# Patient Record
Sex: FEMALE | Race: BLACK OR AFRICAN AMERICAN | NOT HISPANIC OR LATINO | Employment: UNEMPLOYED | URBAN - METROPOLITAN AREA
[De-identification: names, ages, dates, MRNs, and addresses within clinical notes are randomized per-mention and may not be internally consistent; named-entity substitution may affect disease eponyms.]

---

## 2021-02-17 ENCOUNTER — PREP FOR SURGERY (OUTPATIENT)
Dept: OTHER | Facility: HOSPITAL | Age: 41
End: 2021-02-17

## 2021-02-17 DIAGNOSIS — D25.1 INTRAMURAL LEIOMYOMA OF UTERUS: Primary | ICD-10-CM

## 2021-02-17 PROBLEM — D21.9 FIBROID: Status: ACTIVE | Noted: 2021-02-17

## 2021-02-17 RX ORDER — SODIUM CHLORIDE 0.9 % (FLUSH) 0.9 %
3 SYRINGE (ML) INJECTION EVERY 12 HOURS SCHEDULED
Status: CANCELLED | OUTPATIENT
Start: 2021-02-17

## 2021-02-17 RX ORDER — SODIUM CHLORIDE 0.9 % (FLUSH) 0.9 %
10 SYRINGE (ML) INJECTION AS NEEDED
Status: CANCELLED | OUTPATIENT
Start: 2021-02-17

## 2021-02-17 RX ORDER — HEPARIN SODIUM 5000 [USP'U]/ML
5000 INJECTION, SOLUTION INTRAVENOUS; SUBCUTANEOUS ONCE
Status: CANCELLED | OUTPATIENT
Start: 2021-02-17 | End: 2021-02-17

## 2021-02-17 RX ORDER — CEFAZOLIN SODIUM 2 G/100ML
2 INJECTION, SOLUTION INTRAVENOUS ONCE
Status: CANCELLED | OUTPATIENT
Start: 2021-02-17 | End: 2021-02-17

## 2021-02-17 RX ORDER — GABAPENTIN 300 MG/1
600 CAPSULE ORAL ONCE
Status: CANCELLED | OUTPATIENT
Start: 2021-02-17 | End: 2021-02-17

## 2021-02-17 RX ORDER — ACETAMINOPHEN 500 MG
1000 TABLET ORAL ONCE
Status: CANCELLED | OUTPATIENT
Start: 2021-02-17 | End: 2021-02-17

## 2021-03-16 ENCOUNTER — APPOINTMENT (OUTPATIENT)
Dept: PREADMISSION TESTING | Facility: HOSPITAL | Age: 41
End: 2021-03-16

## 2021-03-16 PROCEDURE — U0004 COV-19 TEST NON-CDC HGH THRU: HCPCS

## 2021-03-16 PROCEDURE — C9803 HOPD COVID-19 SPEC COLLECT: HCPCS

## 2021-03-17 ENCOUNTER — ANESTHESIA EVENT (OUTPATIENT)
Dept: PERIOP | Facility: HOSPITAL | Age: 41
End: 2021-03-17

## 2021-03-17 ENCOUNTER — APPOINTMENT (OUTPATIENT)
Dept: PREADMISSION TESTING | Facility: HOSPITAL | Age: 41
End: 2021-03-17

## 2021-03-17 VITALS — WEIGHT: 212.74 LBS | BODY MASS INDEX: 34.19 KG/M2 | HEIGHT: 66 IN

## 2021-03-17 DIAGNOSIS — D25.1 INTRAMURAL LEIOMYOMA OF UTERUS: ICD-10-CM

## 2021-03-17 LAB
B-HCG UR QL: NEGATIVE
BASOPHILS # BLD AUTO: 0 10*3/MM3 (ref 0–0.2)
BASOPHILS NFR BLD AUTO: 0 % (ref 0–1.5)
BILIRUB UR QL STRIP: NEGATIVE
CLARITY UR: CLEAR
COLOR UR: YELLOW
DEPRECATED RDW RBC AUTO: 42.1 FL (ref 37–54)
EOSINOPHIL # BLD AUTO: 0.04 10*3/MM3 (ref 0–0.4)
EOSINOPHIL NFR BLD AUTO: 0.8 % (ref 0.3–6.2)
ERYTHROCYTE [DISTWIDTH] IN BLOOD BY AUTOMATED COUNT: 14.6 % (ref 12.3–15.4)
GLUCOSE UR STRIP-MCNC: NEGATIVE MG/DL
HBA1C MFR BLD: 6.6 % (ref 4.8–5.6)
HCT VFR BLD AUTO: 39.2 % (ref 34–46.6)
HGB BLD-MCNC: 12.4 G/DL (ref 12–15.9)
HGB UR QL STRIP.AUTO: NEGATIVE
IMM GRANULOCYTES # BLD AUTO: 0.01 10*3/MM3 (ref 0–0.05)
IMM GRANULOCYTES NFR BLD AUTO: 0.2 % (ref 0–0.5)
KETONES UR QL STRIP: NEGATIVE
LEUKOCYTE ESTERASE UR QL STRIP.AUTO: NEGATIVE
LYMPHOCYTES # BLD AUTO: 1.68 10*3/MM3 (ref 0.7–3.1)
LYMPHOCYTES NFR BLD AUTO: 31.8 % (ref 19.6–45.3)
MCH RBC QN AUTO: 25.2 PG (ref 26.6–33)
MCHC RBC AUTO-ENTMCNC: 31.6 G/DL (ref 31.5–35.7)
MCV RBC AUTO: 79.5 FL (ref 79–97)
MONOCYTES # BLD AUTO: 0.33 10*3/MM3 (ref 0.1–0.9)
MONOCYTES NFR BLD AUTO: 6.2 % (ref 5–12)
NEUTROPHILS NFR BLD AUTO: 3.23 10*3/MM3 (ref 1.7–7)
NEUTROPHILS NFR BLD AUTO: 61 % (ref 42.7–76)
NITRITE UR QL STRIP: NEGATIVE
NRBC BLD AUTO-RTO: 0 /100 WBC (ref 0–0.2)
PH UR STRIP.AUTO: 6 [PH] (ref 5–8)
PLATELET # BLD AUTO: 128 10*3/MM3 (ref 140–450)
PMV BLD AUTO: 10.8 FL (ref 6–12)
POTASSIUM SERPL-SCNC: 4 MMOL/L (ref 3.5–5.2)
PROT UR QL STRIP: NEGATIVE
QT INTERVAL: 394 MS
QTC INTERVAL: 425 MS
RBC # BLD AUTO: 4.93 10*6/MM3 (ref 3.77–5.28)
SARS-COV-2 RNA NOSE QL NAA+PROBE: NOT DETECTED
SP GR UR STRIP: 1.01 (ref 1–1.03)
UROBILINOGEN UR QL STRIP: NORMAL
WBC # BLD AUTO: 5.29 10*3/MM3 (ref 3.4–10.8)

## 2021-03-17 PROCEDURE — 36415 COLL VENOUS BLD VENIPUNCTURE: CPT

## 2021-03-17 PROCEDURE — 81025 URINE PREGNANCY TEST: CPT

## 2021-03-17 PROCEDURE — 84132 ASSAY OF SERUM POTASSIUM: CPT

## 2021-03-17 PROCEDURE — 81003 URINALYSIS AUTO W/O SCOPE: CPT

## 2021-03-17 PROCEDURE — 85025 COMPLETE CBC W/AUTO DIFF WBC: CPT

## 2021-03-17 PROCEDURE — 83036 HEMOGLOBIN GLYCOSYLATED A1C: CPT

## 2021-03-17 PROCEDURE — 93005 ELECTROCARDIOGRAM TRACING: CPT

## 2021-03-17 PROCEDURE — 93010 ELECTROCARDIOGRAM REPORT: CPT | Performed by: INTERNAL MEDICINE

## 2021-03-17 RX ORDER — ATORVASTATIN CALCIUM 40 MG/1
40 TABLET, FILM COATED ORAL DAILY
COMMUNITY
End: 2021-06-22 | Stop reason: SDUPTHER

## 2021-03-17 RX ORDER — DICYCLOMINE HYDROCHLORIDE 10 MG/1
10 CAPSULE ORAL 2 TIMES DAILY
COMMUNITY
End: 2021-06-22

## 2021-03-17 RX ORDER — ASPIRIN 81 MG/1
81 TABLET ORAL DAILY
COMMUNITY
End: 2021-06-22

## 2021-03-17 RX ORDER — LISINOPRIL 5 MG/1
5 TABLET ORAL DAILY
COMMUNITY
End: 2021-06-22

## 2021-03-17 RX ORDER — FAMOTIDINE 10 MG/ML
20 INJECTION, SOLUTION INTRAVENOUS ONCE
Status: CANCELLED | OUTPATIENT
Start: 2021-03-17 | End: 2021-03-17

## 2021-03-17 NOTE — PAT
Patient to apply Chlorhexadine wipes  to surgical area (as instructed) the night before procedure and the AM of procedure. Wipes provided.    Patient instructed to drink 20 ounces (or until full) of Gatorade and it needs to be completed 1 hour before given arrival time for procedure (NO RED Gatorade)    Patient verbalized understanding.    Patient did not sign consent in PAT, she had some things she would like to discuss with Dr. Alfonso prior to signing, please obtain DOS.     Dr. Alfonso's office notifed of patient taking 81mg aspirin daily preventatively, did not know she was supposed to stop and took dose today. Instructed patient to not take dose of aspirin tomorrow.

## 2021-03-18 ENCOUNTER — ANESTHESIA (OUTPATIENT)
Dept: PERIOP | Facility: HOSPITAL | Age: 41
End: 2021-03-18

## 2021-03-18 ENCOUNTER — HOSPITAL ENCOUNTER (INPATIENT)
Facility: HOSPITAL | Age: 41
LOS: 2 days | Discharge: HOME | End: 2021-03-20
Attending: OBSTETRICS & GYNECOLOGY | Admitting: OBSTETRICS & GYNECOLOGY

## 2021-03-18 DIAGNOSIS — D21.9 FIBROID: Primary | ICD-10-CM

## 2021-03-18 DIAGNOSIS — D25.1 INTRAMURAL LEIOMYOMA OF UTERUS: ICD-10-CM

## 2021-03-18 DIAGNOSIS — D21.9 FIBROIDS: ICD-10-CM

## 2021-03-18 LAB
B-HCG UR QL: NEGATIVE
GLUCOSE BLDC GLUCOMTR-MCNC: 108 MG/DL (ref 70–130)
GLUCOSE BLDC GLUCOMTR-MCNC: 145 MG/DL (ref 70–130)
INTERNAL NEGATIVE CONTROL: NEGATIVE
INTERNAL POSITIVE CONTROL: POSITIVE
Lab: NORMAL

## 2021-03-18 PROCEDURE — 25010000002 FENTANYL CITRATE (PF) 100 MCG/2ML SOLUTION: Performed by: NURSE ANESTHETIST, CERTIFIED REGISTERED

## 2021-03-18 PROCEDURE — 0UB90ZZ EXCISION OF UTERUS, OPEN APPROACH: ICD-10-PCS | Performed by: OBSTETRICS & GYNECOLOGY

## 2021-03-18 PROCEDURE — 25010000002 HYDROMORPHONE PER 4 MG: Performed by: ANESTHESIOLOGY

## 2021-03-18 PROCEDURE — 25010000002 DEXAMETHASONE SODIUM PHOSPHATE 10 MG/ML SOLUTION: Performed by: ANESTHESIOLOGY

## 2021-03-18 PROCEDURE — 25010000002 ONDANSETRON PER 1 MG: Performed by: NURSE ANESTHETIST, CERTIFIED REGISTERED

## 2021-03-18 PROCEDURE — 25010000002 PROPOFOL 10 MG/ML EMULSION: Performed by: NURSE ANESTHETIST, CERTIFIED REGISTERED

## 2021-03-18 PROCEDURE — 25010000002 HEPARIN (PORCINE) PER 1000 UNITS: Performed by: OBSTETRICS & GYNECOLOGY

## 2021-03-18 PROCEDURE — 25010000002 ONDANSETRON PER 1 MG: Performed by: OBSTETRICS & GYNECOLOGY

## 2021-03-18 PROCEDURE — 82962 GLUCOSE BLOOD TEST: CPT

## 2021-03-18 PROCEDURE — 25010000003 CEFAZOLIN IN DEXTROSE 2-4 GM/100ML-% SOLUTION: Performed by: OBSTETRICS & GYNECOLOGY

## 2021-03-18 PROCEDURE — 88305 TISSUE EXAM BY PATHOLOGIST: CPT | Performed by: OBSTETRICS & GYNECOLOGY

## 2021-03-18 PROCEDURE — 25010000002 FENTANYL CITRATE (PF) 100 MCG/2ML SOLUTION: Performed by: ANESTHESIOLOGY

## 2021-03-18 PROCEDURE — C1765 ADHESION BARRIER: HCPCS | Performed by: OBSTETRICS & GYNECOLOGY

## 2021-03-18 PROCEDURE — 25010000002 NEOSTIGMINE 10 MG/10ML SOLUTION: Performed by: NURSE ANESTHETIST, CERTIFIED REGISTERED

## 2021-03-18 PROCEDURE — 25010000002 BUPRENORPHINE PER 0.1 MG: Performed by: ANESTHESIOLOGY

## 2021-03-18 PROCEDURE — 81025 URINE PREGNANCY TEST: CPT | Performed by: ANESTHESIOLOGY

## 2021-03-18 DEVICE — ABSORBABLE ADHESION BARRIER
Type: IMPLANTABLE DEVICE | Site: ABDOMEN | Status: FUNCTIONAL
Brand: GYNECARE INTERCEED

## 2021-03-18 RX ORDER — FENTANYL CITRATE 50 UG/ML
50 INJECTION, SOLUTION INTRAMUSCULAR; INTRAVENOUS
Status: DISCONTINUED | OUTPATIENT
Start: 2021-03-18 | End: 2021-03-18 | Stop reason: HOSPADM

## 2021-03-18 RX ORDER — MAGNESIUM HYDROXIDE 1200 MG/15ML
LIQUID ORAL AS NEEDED
Status: DISCONTINUED | OUTPATIENT
Start: 2021-03-18 | End: 2021-03-18 | Stop reason: HOSPADM

## 2021-03-18 RX ORDER — NEOSTIGMINE METHYLSULFATE 1 MG/ML
INJECTION, SOLUTION INTRAVENOUS AS NEEDED
Status: DISCONTINUED | OUTPATIENT
Start: 2021-03-18 | End: 2021-03-18 | Stop reason: SURG

## 2021-03-18 RX ORDER — DICYCLOMINE HYDROCHLORIDE 10 MG/1
10 CAPSULE ORAL 3 TIMES DAILY
Status: DISCONTINUED | OUTPATIENT
Start: 2021-03-18 | End: 2021-03-20 | Stop reason: HOSPADM

## 2021-03-18 RX ORDER — MELOXICAM 7.5 MG/1
7.5 TABLET ORAL DAILY
Status: COMPLETED | OUTPATIENT
Start: 2021-03-19 | End: 2021-03-20

## 2021-03-18 RX ORDER — GLYCOPYRROLATE 0.2 MG/ML
INJECTION INTRAMUSCULAR; INTRAVENOUS AS NEEDED
Status: DISCONTINUED | OUTPATIENT
Start: 2021-03-18 | End: 2021-03-18 | Stop reason: SURG

## 2021-03-18 RX ORDER — OXYCODONE HYDROCHLORIDE 5 MG/1
5 TABLET ORAL EVERY 4 HOURS PRN
Status: DISCONTINUED | OUTPATIENT
Start: 2021-03-18 | End: 2021-03-20 | Stop reason: HOSPADM

## 2021-03-18 RX ORDER — ONDANSETRON 2 MG/ML
INJECTION INTRAMUSCULAR; INTRAVENOUS AS NEEDED
Status: DISCONTINUED | OUTPATIENT
Start: 2021-03-18 | End: 2021-03-18 | Stop reason: SURG

## 2021-03-18 RX ORDER — SODIUM CHLORIDE 0.9 % (FLUSH) 0.9 %
10 SYRINGE (ML) INJECTION EVERY 12 HOURS SCHEDULED
Status: DISCONTINUED | OUTPATIENT
Start: 2021-03-18 | End: 2021-03-18 | Stop reason: HOSPADM

## 2021-03-18 RX ORDER — ROCURONIUM BROMIDE 10 MG/ML
INJECTION, SOLUTION INTRAVENOUS AS NEEDED
Status: DISCONTINUED | OUTPATIENT
Start: 2021-03-18 | End: 2021-03-18 | Stop reason: SURG

## 2021-03-18 RX ORDER — METOCLOPRAMIDE 10 MG/1
10 TABLET ORAL EVERY 6 HOURS PRN
Status: DISCONTINUED | OUTPATIENT
Start: 2021-03-18 | End: 2021-03-19

## 2021-03-18 RX ORDER — LIDOCAINE HYDROCHLORIDE 10 MG/ML
INJECTION, SOLUTION EPIDURAL; INFILTRATION; INTRACAUDAL; PERINEURAL AS NEEDED
Status: DISCONTINUED | OUTPATIENT
Start: 2021-03-18 | End: 2021-03-18 | Stop reason: SURG

## 2021-03-18 RX ORDER — ACETAMINOPHEN 500 MG
1000 TABLET ORAL EVERY 8 HOURS SCHEDULED
Status: DISCONTINUED | OUTPATIENT
Start: 2021-03-18 | End: 2021-03-20 | Stop reason: HOSPADM

## 2021-03-18 RX ORDER — FAMOTIDINE 20 MG/1
20 TABLET, FILM COATED ORAL ONCE
Status: COMPLETED | OUTPATIENT
Start: 2021-03-18 | End: 2021-03-18

## 2021-03-18 RX ORDER — ACETAMINOPHEN 500 MG
1000 TABLET ORAL AS NEEDED
COMMUNITY
End: 2021-03-20 | Stop reason: HOSPADM

## 2021-03-18 RX ORDER — POLYETHYLENE GLYCOL 3350 17 G/17G
17 POWDER, FOR SOLUTION ORAL DAILY
Status: DISCONTINUED | OUTPATIENT
Start: 2021-03-18 | End: 2021-03-20 | Stop reason: HOSPADM

## 2021-03-18 RX ORDER — ONDANSETRON 4 MG/1
4 TABLET, FILM COATED ORAL EVERY 6 HOURS PRN
Status: DISCONTINUED | OUTPATIENT
Start: 2021-03-18 | End: 2021-03-20 | Stop reason: HOSPADM

## 2021-03-18 RX ORDER — HEPARIN SODIUM 5000 [USP'U]/ML
INJECTION, SOLUTION INTRAVENOUS; SUBCUTANEOUS AS NEEDED
Status: DISCONTINUED | OUTPATIENT
Start: 2021-03-18 | End: 2021-03-18 | Stop reason: HOSPADM

## 2021-03-18 RX ORDER — MIDAZOLAM HYDROCHLORIDE 1 MG/ML
2 INJECTION INTRAMUSCULAR; INTRAVENOUS
Status: DISCONTINUED | OUTPATIENT
Start: 2021-03-18 | End: 2021-03-18 | Stop reason: HOSPADM

## 2021-03-18 RX ORDER — ONDANSETRON 2 MG/ML
4 INJECTION INTRAMUSCULAR; INTRAVENOUS EVERY 6 HOURS PRN
Status: DISCONTINUED | OUTPATIENT
Start: 2021-03-18 | End: 2021-03-20 | Stop reason: HOSPADM

## 2021-03-18 RX ORDER — ACETAMINOPHEN 500 MG
1000 TABLET ORAL ONCE
Status: COMPLETED | OUTPATIENT
Start: 2021-03-18 | End: 2021-03-18

## 2021-03-18 RX ORDER — MIDAZOLAM HYDROCHLORIDE 1 MG/ML
1 INJECTION INTRAMUSCULAR; INTRAVENOUS
Status: DISCONTINUED | OUTPATIENT
Start: 2021-03-18 | End: 2021-03-18 | Stop reason: HOSPADM

## 2021-03-18 RX ORDER — SODIUM CHLORIDE, SODIUM LACTATE, POTASSIUM CHLORIDE, CALCIUM CHLORIDE 600; 310; 30; 20 MG/100ML; MG/100ML; MG/100ML; MG/100ML
100 INJECTION, SOLUTION INTRAVENOUS CONTINUOUS
Status: DISCONTINUED | OUTPATIENT
Start: 2021-03-18 | End: 2021-03-20 | Stop reason: HOSPADM

## 2021-03-18 RX ORDER — LISINOPRIL 5 MG/1
5 TABLET ORAL NIGHTLY
Status: DISCONTINUED | OUTPATIENT
Start: 2021-03-18 | End: 2021-03-20 | Stop reason: HOSPADM

## 2021-03-18 RX ORDER — METOCLOPRAMIDE HYDROCHLORIDE 5 MG/ML
10 INJECTION INTRAMUSCULAR; INTRAVENOUS EVERY 6 HOURS PRN
Status: DISCONTINUED | OUTPATIENT
Start: 2021-03-18 | End: 2021-03-20 | Stop reason: HOSPADM

## 2021-03-18 RX ORDER — GABAPENTIN 300 MG/1
600 CAPSULE ORAL ONCE
Status: COMPLETED | OUTPATIENT
Start: 2021-03-18 | End: 2021-03-18

## 2021-03-18 RX ORDER — HYDROMORPHONE HYDROCHLORIDE 1 MG/ML
0.5 INJECTION, SOLUTION INTRAMUSCULAR; INTRAVENOUS; SUBCUTANEOUS
Status: DISCONTINUED | OUTPATIENT
Start: 2021-03-18 | End: 2021-03-18 | Stop reason: HOSPADM

## 2021-03-18 RX ORDER — FENTANYL CITRATE 50 UG/ML
INJECTION, SOLUTION INTRAMUSCULAR; INTRAVENOUS AS NEEDED
Status: DISCONTINUED | OUTPATIENT
Start: 2021-03-18 | End: 2021-03-18 | Stop reason: SURG

## 2021-03-18 RX ORDER — SODIUM CHLORIDE 0.9 % (FLUSH) 0.9 %
10 SYRINGE (ML) INJECTION AS NEEDED
Status: DISCONTINUED | OUTPATIENT
Start: 2021-03-18 | End: 2021-03-18 | Stop reason: HOSPADM

## 2021-03-18 RX ORDER — BUPRENORPHINE HYDROCHLORIDE 0.32 MG/ML
INJECTION INTRAMUSCULAR; INTRAVENOUS
Status: COMPLETED | OUTPATIENT
Start: 2021-03-18 | End: 2021-03-18

## 2021-03-18 RX ORDER — PROPOFOL 10 MG/ML
VIAL (ML) INTRAVENOUS AS NEEDED
Status: DISCONTINUED | OUTPATIENT
Start: 2021-03-18 | End: 2021-03-18 | Stop reason: SURG

## 2021-03-18 RX ORDER — HEPARIN SODIUM 5000 [USP'U]/ML
5000 INJECTION, SOLUTION INTRAVENOUS; SUBCUTANEOUS ONCE
Status: DISCONTINUED | OUTPATIENT
Start: 2021-03-18 | End: 2021-03-18 | Stop reason: HOSPADM

## 2021-03-18 RX ORDER — BUPIVACAINE HYDROCHLORIDE 2.5 MG/ML
INJECTION, SOLUTION EPIDURAL; INFILTRATION; INTRACAUDAL
Status: COMPLETED | OUTPATIENT
Start: 2021-03-18 | End: 2021-03-18

## 2021-03-18 RX ORDER — GABAPENTIN 100 MG/1
100 CAPSULE ORAL 3 TIMES DAILY
Status: DISCONTINUED | OUTPATIENT
Start: 2021-03-18 | End: 2021-03-20 | Stop reason: HOSPADM

## 2021-03-18 RX ORDER — SODIUM CHLORIDE, SODIUM LACTATE, POTASSIUM CHLORIDE, CALCIUM CHLORIDE 600; 310; 30; 20 MG/100ML; MG/100ML; MG/100ML; MG/100ML
9 INJECTION, SOLUTION INTRAVENOUS CONTINUOUS
Status: DISCONTINUED | OUTPATIENT
Start: 2021-03-18 | End: 2021-03-18 | Stop reason: HOSPADM

## 2021-03-18 RX ORDER — CEFAZOLIN SODIUM 2 G/100ML
2 INJECTION, SOLUTION INTRAVENOUS EVERY 8 HOURS
Status: COMPLETED | OUTPATIENT
Start: 2021-03-18 | End: 2021-03-19

## 2021-03-18 RX ORDER — LIDOCAINE HYDROCHLORIDE 10 MG/ML
0.5 INJECTION, SOLUTION EPIDURAL; INFILTRATION; INTRACAUDAL; PERINEURAL ONCE AS NEEDED
Status: COMPLETED | OUTPATIENT
Start: 2021-03-18 | End: 2021-03-18

## 2021-03-18 RX ORDER — SODIUM CHLORIDE 0.9 % (FLUSH) 0.9 %
3 SYRINGE (ML) INJECTION EVERY 12 HOURS SCHEDULED
Status: DISCONTINUED | OUTPATIENT
Start: 2021-03-18 | End: 2021-03-18 | Stop reason: HOSPADM

## 2021-03-18 RX ORDER — DEXAMETHASONE SODIUM PHOSPHATE 10 MG/ML
INJECTION, SOLUTION INTRAMUSCULAR; INTRAVENOUS
Status: COMPLETED | OUTPATIENT
Start: 2021-03-18 | End: 2021-03-18

## 2021-03-18 RX ORDER — CEFAZOLIN SODIUM 2 G/100ML
2 INJECTION, SOLUTION INTRAVENOUS ONCE
Status: COMPLETED | OUTPATIENT
Start: 2021-03-18 | End: 2021-03-18

## 2021-03-18 RX ADMIN — DICYCLOMINE HYDROCHLORIDE 10 MG: 10 CAPSULE ORAL at 17:44

## 2021-03-18 RX ADMIN — LINAGLIPTIN 5 MG: 5 TABLET, FILM COATED ORAL at 20:43

## 2021-03-18 RX ADMIN — ACETAMINOPHEN 1000 MG: 500 TABLET ORAL at 11:23

## 2021-03-18 RX ADMIN — NEOSTIGMINE 5 MG: 1 INJECTION INTRAVENOUS at 14:33

## 2021-03-18 RX ADMIN — SODIUM CHLORIDE, POTASSIUM CHLORIDE, SODIUM LACTATE AND CALCIUM CHLORIDE 9 ML/HR: 600; 310; 30; 20 INJECTION, SOLUTION INTRAVENOUS at 11:01

## 2021-03-18 RX ADMIN — PROPOFOL 200 MG: 10 INJECTION, EMULSION INTRAVENOUS at 13:12

## 2021-03-18 RX ADMIN — GABAPENTIN 600 MG: 300 CAPSULE ORAL at 11:23

## 2021-03-18 RX ADMIN — DICYCLOMINE HYDROCHLORIDE 10 MG: 10 CAPSULE ORAL at 20:43

## 2021-03-18 RX ADMIN — OXYCODONE 5 MG: 5 TABLET ORAL at 17:43

## 2021-03-18 RX ADMIN — ACETAMINOPHEN 1000 MG: 500 TABLET ORAL at 17:44

## 2021-03-18 RX ADMIN — LISINOPRIL 5 MG: 5 TABLET ORAL at 20:43

## 2021-03-18 RX ADMIN — POLYETHYLENE GLYCOL 3350 17 G: 17 POWDER, FOR SOLUTION ORAL at 17:44

## 2021-03-18 RX ADMIN — CEFAZOLIN SODIUM 2 G: 2 INJECTION, SOLUTION INTRAVENOUS at 13:06

## 2021-03-18 RX ADMIN — FENTANYL CITRATE 50 MCG: 50 INJECTION, SOLUTION INTRAMUSCULAR; INTRAVENOUS at 14:36

## 2021-03-18 RX ADMIN — HYDROMORPHONE HYDROCHLORIDE 0.5 MG: 1 INJECTION, SOLUTION INTRAMUSCULAR; INTRAVENOUS; SUBCUTANEOUS at 15:02

## 2021-03-18 RX ADMIN — BUPRENORPHINE HYDROCHLORIDE 0.3 MG: 0.32 INJECTION INTRAMUSCULAR; INTRAVENOUS at 13:15

## 2021-03-18 RX ADMIN — ONDANSETRON 4 MG: 2 INJECTION INTRAMUSCULAR; INTRAVENOUS at 21:59

## 2021-03-18 RX ADMIN — GLYCOPYRROLATE 8 MG: 0.4 INJECTION INTRAMUSCULAR; INTRAVENOUS at 14:33

## 2021-03-18 RX ADMIN — BUPIVACAINE HYDROCHLORIDE 60 ML: 2.5 INJECTION, SOLUTION EPIDURAL; INFILTRATION; INTRACAUDAL; PERINEURAL at 13:15

## 2021-03-18 RX ADMIN — METFORMIN HYDROCHLORIDE 1000 MG: 1000 TABLET ORAL at 17:43

## 2021-03-18 RX ADMIN — FENTANYL CITRATE 50 MCG: 50 INJECTION, SOLUTION INTRAMUSCULAR; INTRAVENOUS at 15:28

## 2021-03-18 RX ADMIN — DEXAMETHASONE SODIUM PHOSPHATE 4 MG: 10 INJECTION, SOLUTION INTRAMUSCULAR; INTRAVENOUS at 13:15

## 2021-03-18 RX ADMIN — HYDROMORPHONE HYDROCHLORIDE 0.5 MG: 1 INJECTION, SOLUTION INTRAMUSCULAR; INTRAVENOUS; SUBCUTANEOUS at 15:17

## 2021-03-18 RX ADMIN — DEXAMETHASONE SODIUM PHOSPHATE 6 MG: 10 INJECTION, SOLUTION INTRAMUSCULAR; INTRAVENOUS at 13:18

## 2021-03-18 RX ADMIN — ROCURONIUM BROMIDE 40 MG: 10 INJECTION INTRAVENOUS at 13:12

## 2021-03-18 RX ADMIN — SODIUM CHLORIDE, POTASSIUM CHLORIDE, SODIUM LACTATE AND CALCIUM CHLORIDE 100 ML/HR: 600; 310; 30; 20 INJECTION, SOLUTION INTRAVENOUS at 17:16

## 2021-03-18 RX ADMIN — CEFAZOLIN SODIUM 2 G: 2 INJECTION, SOLUTION INTRAVENOUS at 20:43

## 2021-03-18 RX ADMIN — GABAPENTIN 100 MG: 100 CAPSULE ORAL at 17:43

## 2021-03-18 RX ADMIN — ONDANSETRON 4 MG: 2 INJECTION INTRAMUSCULAR; INTRAVENOUS at 14:33

## 2021-03-18 RX ADMIN — PROPOFOL 25 MCG/KG/MIN: 10 INJECTION, EMULSION INTRAVENOUS at 13:19

## 2021-03-18 RX ADMIN — FENTANYL CITRATE 50 MCG: 50 INJECTION, SOLUTION INTRAMUSCULAR; INTRAVENOUS at 15:41

## 2021-03-18 RX ADMIN — FENTANYL CITRATE 50 MCG: 50 INJECTION, SOLUTION INTRAMUSCULAR; INTRAVENOUS at 13:11

## 2021-03-18 RX ADMIN — SODIUM CHLORIDE, POTASSIUM CHLORIDE, SODIUM LACTATE AND CALCIUM CHLORIDE: 600; 310; 30; 20 INJECTION, SOLUTION INTRAVENOUS at 14:25

## 2021-03-18 RX ADMIN — OXYCODONE 5 MG: 5 TABLET ORAL at 21:59

## 2021-03-18 RX ADMIN — LIDOCAINE HYDROCHLORIDE 50 MG: 10 INJECTION, SOLUTION EPIDURAL; INFILTRATION; INTRACAUDAL; PERINEURAL at 13:12

## 2021-03-18 RX ADMIN — HYDROMORPHONE HYDROCHLORIDE 0.5 MG: 1 INJECTION, SOLUTION INTRAMUSCULAR; INTRAVENOUS; SUBCUTANEOUS at 14:57

## 2021-03-18 RX ADMIN — FAMOTIDINE 20 MG: 20 TABLET ORAL at 11:23

## 2021-03-18 RX ADMIN — LIDOCAINE HYDROCHLORIDE 0.2 ML: 10 INJECTION, SOLUTION EPIDURAL; INFILTRATION; INTRACAUDAL; PERINEURAL at 11:01

## 2021-03-18 NOTE — ANESTHESIA PROCEDURE NOTES
Airway  Urgency: elective    Date/Time: 3/18/2021 1:13 PM  Airway not difficult    General Information and Staff    Patient location during procedure: OR  CRNA: Magalys Valverde CRNA    Indications and Patient Condition  Indications for airway management: airway protection    Preoxygenated: yes  MILS not maintained throughout  Mask difficulty assessment: 1 - vent by mask    Final Airway Details  Final airway type: endotracheal airway      Successful airway: ETT  Cuffed: yes   Successful intubation technique: direct laryngoscopy  Facilitating devices/methods: intubating stylet  Endotracheal tube insertion site: oral  Blade: Aidan  Blade size: 3  ETT size (mm): 7.0  Cormack-Lehane Classification: grade I - full view of glottis  Placement verified by: chest auscultation and capnometry   Measured from: lips  ETT/EBT  to lips (cm): 20  Number of attempts at approach: 1  Assessment: lips, teeth, and gum same as pre-op and atraumatic intubation    Additional Comments  Negative epigastric sounds, Breath sound equal bilaterally with symmetric chest rise and fall

## 2021-03-18 NOTE — ANESTHESIA PROCEDURE NOTES
Peripheral Block      Patient reassessed immediately prior to procedure    Patient location during procedure: OR  Reason for block: at surgeon's request and post-op pain management  Performed by  CRNA: Hany Juarez CRNA  Preanesthetic Checklist  Completed: patient identified, IV checked, site marked, risks and benefits discussed, surgical consent, monitors and equipment checked, pre-op evaluation and timeout performed  Prep:  Pt Position: supine  Sterile barriers:cap, gloves, sterile barriers and mask  Prep: ChloraPrep  Patient monitoring: blood pressure monitoring, continuous pulse oximetry and EKG  Procedure  Sedation:yes  Performed under: general  Guidance:ultrasound guided  Images:still images obtained, printed/placed on chart    Laterality:Bilateral  Block Type:TAP  Injection Technique:single-shot  Needle Type:short-bevel and echogenic  Needle Gauge:20 G  Resistance on Injection: none    Medications Used: buprenorphine (BUPRENEX) injection, 0.3 mg  bupivacaine PF (MARCAINE) 0.25 % injection, 60 mL  dexamethasone sodium phosphate injection, 4 mg  Med admintered at 3/18/2021 1:15 PM      Medications  Comment:Block Injection:  LA dose divided between Right and Left block        Post Assessment  Injection Assessment: negative aspiration for heme, incremental injection and no paresthesia on injection  Patient Tolerance:comfortable throughout block  Complications:no  Additional Notes      Under Ultrasound guidance, a BBraun 4inch 360 degree needle was advanced with Normal Saline hydro dissection of tissue.  The Internal Oblique and Transversus Abdominus muscles where visualized.  At or before the aponeurosis of Internal Oblique, local anesthetic spread was visualized in the Transversus Abdominus Plane. Injection was made incrementally with aspiration every 5 mls.  There was no  intravascular injection,  injection pressure was normal, there was no neural injection, and the procedure was completed without difficulty.   Thank You.

## 2021-03-18 NOTE — OP NOTE
Operative  Procedure Note      Pre-operative Diagnosis: Uterine leiomyoma    Post-operative Diagnosis: Uterine leiomyoma    Operation: Abdominal myomectomy    Surgeon: Tuyet Alfonso MD     Assistants: Assistant: Trevor Santos RNFA was responsible for performing the following activities: Retraction, Suction, Irrigation, Suturing and Closing and their skilled assistance was necessary for the success of this case.       Anesthesia: General endotracheal anesthesia    Findings: Enlarged uterus and Fibroid uterus    Estimated Blood Loss: 50 mL    Specimens: Uterine fibroids    Complications:  None    Disposition: PACU - hemodynamically stable.      Procedure Details    The patient was seen in the Holding Room. The risks, benefits, complications, treatment options, and expected outcomes were discussed with the patient. The patient concurred with the proposed plan, giving informed consent. The patient was identified as Bernice Camilo and the procedure verified as abdominal myomectomy, possible abdominal hysterectomy..   A Time Out was held and the above information confirmed.    After induction of anesthesia, the patient was draped and prepped in the usual sterile manner.  A Pfannenstiel incision was used to gain intra-abdominal access.  The enlarged, fibroid uterus was then delivered through the incision and inspected.  There was a large fibroid in the fundal region approximately 6 cm.  There were 3 other fibroids in the lower uterine segment between 3 and 4 cm.  There were 2 other fibroids in the cervical region, each less than 2 cm.    The decision was made to leave the cervical fibroids as they were very close to the uterine arteries and the patient had a strong desire to retain her uterus.  The lower uterine segment fibroids were excised first.  The most inferior was accessible on the posterior surface of the uterine serosa.  An incision was made overlying the fibroid.  It was dissected out with  monopolar sharp and blunt dissection.  Anteriorly on the right side, 2 fibroids were accessible through a single incision.  This incision was approximately 3 cm.  The fibroids were shelled out with monopolar, sharp and blunt dissection.  The largest fibroid was removed from the fundus.  This was a vertical incision over the fundus.  This fibroid was approximately 6 cm and again was dissected out with sharp, monopolar and blunt dissection.  Each of the fibroids had been injected with a dilute Pitressin solution prior to dissection.  The uterus was then inspected with palpation.  No other fibroids were noted.  The fundal hysterotomy was repaired first.  This was closed in 2 layers of 0 Vicryl and a third layer of 2-0 Vicryl.  There was good hemostasis.  The 2 more inferior incisions were then closed in a 2 layer closure with 0 Vicryl and 2-0 Vicryl.  The uterus was inspected and was hemostatic.  The uterus was wrapped in Interceed.  It was hemostatic in its normal anatomic position in the pelvis.  The abdominal wall was closed with 0 Vicryl on the fascial layer.  2-0 Vicryl was used on the subcutaneous fat.  The skin was closed with 3-0 Vicryl and skin glue.    The counts were correct x 2 and the patient was awakened on the table. She was taken to RR in stable condition.       Tuyet Alfonso MD  03/18/21  14:41 EDT

## 2021-03-18 NOTE — H&P
Pre-Op H&P  Bernice Camilo  [id number]  1980      Chief complaint: Uterine fibroids      Subjective:  Patient is a 41 y.o.female presents for scheduled surgery by Dr. Alfonso. She anticipates a ABDOMINAL MYOMECTOMY; POSSIBLE TOTAL ABDOMINAL HYSTERECTOMY today. She denies abd pain. She reports regular menstruation.      Review of Systems:  Constitutional-- No fever, chills or sweats. No fatigue.  CV-- No chest pain, palpitation or syncope. +HTN, HLD  Resp-- No SOB, cough, hemoptysis  Skin--No rashes or lesions      Allergies: No Known Allergies      Home Meds:  Medications Prior to Admission   Medication Sig Dispense Refill Last Dose   • acetaminophen (TYLENOL) 500 MG tablet Take 1,000 mg by mouth As Needed for Mild Pain .   3/17/2021 at 2000   • dicyclomine (BENTYL) 10 MG capsule Take 10 mg by mouth 2 (two) times a day.   3/16/2021 at 2100   • aspirin 81 MG EC tablet Take 81 mg by mouth Daily.   3/16/2021 at 2100   • atorvastatin (LIPITOR) 40 MG tablet Take 40 mg by mouth Daily.   3/16/2021 at 2100   • lisinopril (PRINIVIL,ZESTRIL) 5 MG tablet Take 5 mg by mouth Daily.   3/16/2021 at 2100   • metFORMIN (GLUCOPHAGE) 500 MG tablet Take 1,000 mg by mouth 2 (Two) Times a Day With Meals.   3/16/2021 at 2100   • NAPROXEN SODIUM PO Take  by mouth 2 (Two) Times a Day As Needed.   3/16/2021 at 2100   • SITagliptin (JANUVIA) 100 MG tablet Take 100 mg by mouth Daily.   3/16/2021 at 2100         PMH:   Past Medical History:   Diagnosis Date   • Diabetes mellitus (CMS/HCC)    • Hyperlipidemia    • Hypertension      PSH:    Past Surgical History:   Procedure Laterality Date   • HERNIA REPAIR      umbilical       Immunization History:  Influenza: 2020  Pneumococcal: UTD  Tetanus: UTD  Covid : No    Social History:   Tobacco:   Social History     Tobacco Use   Smoking Status Never Smoker   Smokeless Tobacco Never Used      Alcohol:     Social History     Substance and Sexual Activity   Alcohol Use Yes    Comment:  "1/week          Physical Exam:/87 (BP Location: Right arm, Patient Position: Lying)   Pulse 76   Temp 97.3 °F (36.3 °C) (Temporal)   Resp 18   Ht 167.6 cm (66\")   Wt 96.2 kg (212 lb)   LMP 02/21/2021 (Exact Date) Comment: mammogram- feb 2021  SpO2 100%   BMI 34.22 kg/m²       General Appearance:    Alert, cooperative, no distress, appears stated age   Head:    Normocephalic, without obvious abnormality, atraumatic   Lungs:     Clear to auscultation bilaterally, respirations unlabored    Heart:   Regular rate and rhythm, S1 and S2 normal    Abdomen:    Soft without tenderness   Extremities:   Extremities normal, atraumatic, no cyanosis or edema   Skin:   Skin color, texture, turgor normal, no rashes or lesions   Neurologic:   Grossly intact     Results Review:     LABS:  Lab Results   Component Value Date    WBC 5.29 03/17/2021    HGB 12.4 03/17/2021    HCT 39.2 03/17/2021    MCV 79.5 03/17/2021     (L) 03/17/2021    NEUTROABS 3.23 03/17/2021    K 4.0 03/17/2021       RADIOLOGY:  Imaging Results (Last 72 Hours)     ** No results found for the last 72 hours. **          I reviewed the patient's new clinical results.    Cancer Staging (if applicable)  Cancer Patient: __ yes __no __unknown; If yes, clinical stage T:__ N:__M:__, stage group or __N/A      Impression: Uterine fibroids      Plan: ABDOMINAL MYOMECTOMY; POSSIBLE TOTAL ABDOMINAL HYSTERECTOMY      Mary Luu, TISHA   3/18/2021   11:43 EDT  "

## 2021-03-18 NOTE — ANESTHESIA POSTPROCEDURE EVALUATION
Patient: Bernice Camilo    Procedure Summary     Date: 03/18/21 Room / Location:  [facility] /  [facility]    Anesthesia Start: 1306 Anesthesia Stop: 1447    Procedure: ABDOMINAL MYOMECTOMY (N/A Abdomen) Diagnosis:     Surgeons: Tuyet Alfonso MD Provider: Yung Dunham MD    Anesthesia Type: general ASA Status: 3          Anesthesia Type: general    Vitals  Vitals Value Taken Time   /81 03/18/21 1446   Temp 97.3 °F (36.3 °C) 03/18/21 1446   Pulse 77 03/18/21 1446   Resp 14 03/18/21 1446   SpO2 96 % 03/18/21 1446           Post Anesthesia Care and Evaluation    Patient location during evaluation: PACU  Patient participation: complete - patient participated  Level of consciousness: awake and alert  Pain management: adequate  Airway patency: patent  Anesthetic complications: No anesthetic complications  PONV Status: none  Cardiovascular status: hemodynamically stable and acceptable  Respiratory status: nonlabored ventilation, acceptable and nasal cannula  Hydration status: acceptable

## 2021-03-18 NOTE — PLAN OF CARE
Goal Outcome Evaluation:  Plan of Care Reviewed With: patient     Outcome Summary: Post of patient VSS, Bonilla cath in place. Lower abdomen incisional wound with coverderm dressing in place. Pain managed with prn med. Will continue to monitor.

## 2021-03-18 NOTE — ANESTHESIA PREPROCEDURE EVALUATION
Anesthesia Evaluation     Patient summary reviewed and Nursing notes reviewed                Airway   Mallampati: II  TM distance: >3 FB  Neck ROM: full  No difficulty expected  Dental - normal exam     Pulmonary - negative pulmonary ROS and normal exam   Cardiovascular - normal exam    (+) hypertension, hyperlipidemia,       Neuro/Psych- negative ROS  GI/Hepatic/Renal/Endo    (+)   diabetes mellitus,     Musculoskeletal (-) negative ROS    Abdominal  - normal exam    Bowel sounds: normal.   Substance History - negative use     OB/GYN negative ob/gyn ROS         Other                      Anesthesia Plan    ASA 3     general     intravenous induction     Anesthetic plan, all risks, benefits, and alternatives have been provided, discussed and informed consent has been obtained with: patient.    Plan discussed with CRNA.

## 2021-03-19 ENCOUNTER — TELEPHONE (OUTPATIENT)
Dept: PEDIATRICS | Facility: OTHER | Age: 41
End: 2021-03-19

## 2021-03-19 LAB
CYTO UR: NORMAL
DEPRECATED RDW RBC AUTO: 40.9 FL (ref 37–54)
ERYTHROCYTE [DISTWIDTH] IN BLOOD BY AUTOMATED COUNT: 14.4 % (ref 12.3–15.4)
GLUCOSE BLDC GLUCOMTR-MCNC: 102 MG/DL (ref 70–130)
GLUCOSE BLDC GLUCOMTR-MCNC: 143 MG/DL (ref 70–130)
GLUCOSE BLDC GLUCOMTR-MCNC: 153 MG/DL (ref 70–130)
GLUCOSE BLDC GLUCOMTR-MCNC: 160 MG/DL (ref 70–130)
HCT VFR BLD AUTO: 35.4 % (ref 34–46.6)
HGB BLD-MCNC: 11.1 G/DL (ref 12–15.9)
LAB AP CASE REPORT: NORMAL
LAB AP CLINICAL INFORMATION: NORMAL
MCH RBC QN AUTO: 24.8 PG (ref 26.6–33)
MCHC RBC AUTO-ENTMCNC: 31.4 G/DL (ref 31.5–35.7)
MCV RBC AUTO: 79.2 FL (ref 79–97)
PATH REPORT.FINAL DX SPEC: NORMAL
PATH REPORT.GROSS SPEC: NORMAL
PLATELET # BLD AUTO: 123 10*3/MM3 (ref 140–450)
PMV BLD AUTO: 10.8 FL (ref 6–12)
RBC # BLD AUTO: 4.47 10*6/MM3 (ref 3.77–5.28)
WBC # BLD AUTO: 6.6 10*3/MM3 (ref 3.4–10.8)

## 2021-03-19 PROCEDURE — 82962 GLUCOSE BLOOD TEST: CPT

## 2021-03-19 PROCEDURE — 63710000001 PROMETHAZINE PER 25 MG: Performed by: OBSTETRICS & GYNECOLOGY

## 2021-03-19 PROCEDURE — 85027 COMPLETE CBC AUTOMATED: CPT | Performed by: OBSTETRICS & GYNECOLOGY

## 2021-03-19 PROCEDURE — 63710000001 ONDANSETRON PER 8 MG: Performed by: OBSTETRICS & GYNECOLOGY

## 2021-03-19 PROCEDURE — 25010000002 ONDANSETRON PER 1 MG: Performed by: OBSTETRICS & GYNECOLOGY

## 2021-03-19 PROCEDURE — 25010000002 HYDROMORPHONE PER 4 MG: Performed by: OBSTETRICS & GYNECOLOGY

## 2021-03-19 PROCEDURE — 25010000003 CEFAZOLIN IN DEXTROSE 2-4 GM/100ML-% SOLUTION: Performed by: OBSTETRICS & GYNECOLOGY

## 2021-03-19 RX ORDER — PROMETHAZINE HYDROCHLORIDE 25 MG/1
25 TABLET ORAL EVERY 4 HOURS PRN
Status: DISCONTINUED | OUTPATIENT
Start: 2021-03-19 | End: 2021-03-20 | Stop reason: HOSPADM

## 2021-03-19 RX ORDER — SIMETHICONE 80 MG
80 TABLET,CHEWABLE ORAL 4 TIMES DAILY PRN
Status: DISCONTINUED | OUTPATIENT
Start: 2021-03-19 | End: 2021-03-20 | Stop reason: HOSPADM

## 2021-03-19 RX ORDER — HYDROMORPHONE HYDROCHLORIDE 1 MG/ML
0.5 INJECTION, SOLUTION INTRAMUSCULAR; INTRAVENOUS; SUBCUTANEOUS ONCE
Status: COMPLETED | OUTPATIENT
Start: 2021-03-19 | End: 2021-03-19

## 2021-03-19 RX ADMIN — GABAPENTIN 100 MG: 100 CAPSULE ORAL at 16:20

## 2021-03-19 RX ADMIN — OXYCODONE 5 MG: 5 TABLET ORAL at 05:57

## 2021-03-19 RX ADMIN — METFORMIN HYDROCHLORIDE 1000 MG: 1000 TABLET ORAL at 09:33

## 2021-03-19 RX ADMIN — OXYCODONE 5 MG: 5 TABLET ORAL at 18:04

## 2021-03-19 RX ADMIN — DICYCLOMINE HYDROCHLORIDE 10 MG: 10 CAPSULE ORAL at 20:46

## 2021-03-19 RX ADMIN — PROMETHAZINE HYDROCHLORIDE 25 MG: 25 TABLET ORAL at 18:07

## 2021-03-19 RX ADMIN — DICYCLOMINE HYDROCHLORIDE 10 MG: 10 CAPSULE ORAL at 16:20

## 2021-03-19 RX ADMIN — ACETAMINOPHEN 1000 MG: 500 TABLET ORAL at 13:08

## 2021-03-19 RX ADMIN — OXYCODONE 5 MG: 5 TABLET ORAL at 13:08

## 2021-03-19 RX ADMIN — ACETAMINOPHEN 1000 MG: 500 TABLET ORAL at 20:46

## 2021-03-19 RX ADMIN — ONDANSETRON HYDROCHLORIDE 4 MG: 4 TABLET, FILM COATED ORAL at 13:08

## 2021-03-19 RX ADMIN — POLYETHYLENE GLYCOL 3350 17 G: 17 POWDER, FOR SOLUTION ORAL at 09:33

## 2021-03-19 RX ADMIN — ONDANSETRON 4 MG: 2 INJECTION INTRAMUSCULAR; INTRAVENOUS at 06:52

## 2021-03-19 RX ADMIN — GABAPENTIN 100 MG: 100 CAPSULE ORAL at 09:34

## 2021-03-19 RX ADMIN — GABAPENTIN 100 MG: 100 CAPSULE ORAL at 20:46

## 2021-03-19 RX ADMIN — LISINOPRIL 5 MG: 5 TABLET ORAL at 20:46

## 2021-03-19 RX ADMIN — SIMETHICONE 80 MG: 80 TABLET, CHEWABLE ORAL at 18:19

## 2021-03-19 RX ADMIN — PROMETHAZINE HYDROCHLORIDE 25 MG: 25 TABLET ORAL at 08:19

## 2021-03-19 RX ADMIN — MELOXICAM 7.5 MG: 7.5 TABLET ORAL at 09:34

## 2021-03-19 RX ADMIN — HYDROMORPHONE HYDROCHLORIDE 0.5 MG: 1 INJECTION, SOLUTION INTRAMUSCULAR; INTRAVENOUS; SUBCUTANEOUS at 18:19

## 2021-03-19 RX ADMIN — DICYCLOMINE HYDROCHLORIDE 10 MG: 10 CAPSULE ORAL at 09:33

## 2021-03-19 RX ADMIN — CEFAZOLIN SODIUM 2 G: 2 INJECTION, SOLUTION INTRAVENOUS at 05:55

## 2021-03-19 RX ADMIN — METFORMIN HYDROCHLORIDE 1000 MG: 1000 TABLET ORAL at 17:18

## 2021-03-19 RX ADMIN — LINAGLIPTIN 5 MG: 5 TABLET, FILM COATED ORAL at 20:49

## 2021-03-19 RX ADMIN — ACETAMINOPHEN 1000 MG: 500 TABLET ORAL at 05:55

## 2021-03-19 NOTE — PAYOR COMM NOTE
"Bernice Camilo Freddygema (41 y.o. Female)     Date of Birth Social Security Number Address Home Phone MRN    1980  [address] [place], [place] [address] [phone] [id number]    Gnosticist Marital Status          Unknown        Admission Date Admission Type Admitting Provider Attending Provider Department, Room/Bed    3/18/21 Elective Tuyet Alfonso MD Fuson, Jennifer A, MD [facility] 5B, N538/1    Discharge Date Discharge Disposition Discharge Destination                       Attending Provider: Tuyet Alfonso MD    Allergies: No Known Allergies    Isolation: None   Infection: None   Code Status: CPR    Ht: 167.6 cm (66\")   Wt: 96.2 kg (212 lb)    Admission Cmt: None   Principal Problem: None                Active Insurance as of 3/18/2021     Primary Coverage     Payor Plan Insurance Group Employer/Plan Group    [employer] [place] [employer] [place]      Payor Plan Address Payor Plan Phone Number Payor Plan Fax Number Effective Dates    [address] [address] [phone]  3/3/2021 - None Entered    [facility] [address]       Subscriber Name Subscriber Birth Date Member ID       BERNICE CAMILO 1980 [id number]                 Emergency Contacts      (Rel.) Home Phone Work Phone Mobile Phone    Duglas Figueroa (Friend) -- -- [phone]            Insurance Information                [facility]/WELLC[employer] MEDICAID Phone: [phone]    Subscriber: HollinicoleBernice Subscriber#: [id number]    Group#:  Precert#:              History & Physical      Mary Luu APRN at 03/18/21 1143     Attestation signed by Tuyet Alfonso MD at 03/18/21 1235    I have reviewed the notes, assessments, and/or procedures performed by TISHA Garcia, I concur with her/his documentation of Bernice Yadiraon Hollinicole.                     Pre-Op H&P  Bernice Yadiraon Ton  [id number]  1980      Chief complaint: Uterine " fibroids      Subjective:  Patient is a 41 y.o.female presents for scheduled surgery by Dr. Alfonso. She anticipates a ABDOMINAL MYOMECTOMY; POSSIBLE TOTAL ABDOMINAL HYSTERECTOMY today. She denies abd pain. She reports regular menstruation.      Review of Systems:  Constitutional-- No fever, chills or sweats. No fatigue.  CV-- No chest pain, palpitation or syncope. +HTN, HLD  Resp-- No SOB, cough, hemoptysis  Skin--No rashes or lesions      Allergies: No Known Allergies      Home Meds:  Medications Prior to Admission   Medication Sig Dispense Refill Last Dose   • acetaminophen (TYLENOL) 500 MG tablet Take 1,000 mg by mouth As Needed for Mild Pain .   3/17/2021 at 2000   • dicyclomine (BENTYL) 10 MG capsule Take 10 mg by mouth 2 (two) times a day.   3/16/2021 at 2100   • aspirin 81 MG EC tablet Take 81 mg by mouth Daily.   3/16/2021 at 2100   • atorvastatin (LIPITOR) 40 MG tablet Take 40 mg by mouth Daily.   3/16/2021 at 2100   • lisinopril (PRINIVIL,ZESTRIL) 5 MG tablet Take 5 mg by mouth Daily.   3/16/2021 at 2100   • metFORMIN (GLUCOPHAGE) 500 MG tablet Take 1,000 mg by mouth 2 (Two) Times a Day With Meals.   3/16/2021 at 2100   • NAPROXEN SODIUM PO Take  by mouth 2 (Two) Times a Day As Needed.   3/16/2021 at 2100   • SITagliptin (JANUVIA) 100 MG tablet Take 100 mg by mouth Daily.   3/16/2021 at 2100         PMH:   Past Medical History:   Diagnosis Date   • Diabetes mellitus (CMS/HCC)    • Hyperlipidemia    • Hypertension      PSH:    Past Surgical History:   Procedure Laterality Date   • HERNIA REPAIR      umbilical       Immunization History:  Influenza: 2020  Pneumococcal: UTD  Tetanus: UTD  Covid : No    Social History:   Tobacco:   Social History     Tobacco Use   Smoking Status Never Smoker   Smokeless Tobacco Never Used      Alcohol:     Social History     Substance and Sexual Activity   Alcohol Use Yes    Comment: 1/week          Physical Exam:/87 (BP Location: Right arm, Patient Position: Lying)    "Pulse 76   Temp 97.3 °F (36.3 °C) (Temporal)   Resp 18   Ht 167.6 cm (66\")   Wt 96.2 kg (212 lb)   LMP 02/21/2021 (Exact Date) Comment: mammogram- feb 2021  SpO2 100%   BMI 34.22 kg/m²       General Appearance:    Alert, cooperative, no distress, appears stated age   Head:    Normocephalic, without obvious abnormality, atraumatic   Lungs:     Clear to auscultation bilaterally, respirations unlabored    Heart:   Regular rate and rhythm, S1 and S2 normal    Abdomen:    Soft without tenderness   Extremities:   Extremities normal, atraumatic, no cyanosis or edema   Skin:   Skin color, texture, turgor normal, no rashes or lesions   Neurologic:   Grossly intact     Results Review:     LABS:  Lab Results   Component Value Date    WBC 5.29 03/17/2021    HGB 12.4 03/17/2021    HCT 39.2 03/17/2021    MCV 79.5 03/17/2021     (L) 03/17/2021    NEUTROABS 3.23 03/17/2021    K 4.0 03/17/2021       RADIOLOGY:  Imaging Results (Last 72 Hours)     ** No results found for the last 72 hours. **          I reviewed the patient's new clinical results.    Cancer Staging (if applicable)  Cancer Patient: __ yes __no __unknown; If yes, clinical stage T:__ N:__M:__, stage group or __N/A      Impression: Uterine fibroids      Plan: ABDOMINAL MYOMECTOMY; POSSIBLE TOTAL ABDOMINAL HYSTERECTOMY      TISHA Garcia   3/18/2021   11:43 EDT    Electronically signed by Tuyet Alfonso MD at 03/18/21 1235         Lab Results (last 24 hours)     Procedure Component Value Units Date/Time    CBC (No Diff) [[id number]]  (Abnormal) Collected: 03/19/21 0851    Specimen: Blood Updated: 03/19/21 0954     WBC 6.60 10*3/mm3      RBC 4.47 10*6/mm3      Hemoglobin 11.1 g/dL      Hematocrit 35.4 %      MCV 79.2 fL      MCH 24.8 pg      MCHC 31.4 g/dL      RDW 14.4 %      RDW-SD 40.9 fl      MPV 10.8 fL      Platelets 123 10*3/mm3     POC Glucose Once [[id number]]  (Abnormal) Collected: 03/19/21 0801    Specimen: Blood Updated: 03/19/21 0806    "  Glucose 143 mg/dL     POC Glucose Once [[id number]]  (Abnormal) Collected: 03/18/21 1525    Specimen: Blood Updated: 03/18/21 1538     Glucose 145 mg/dL     Tissue Pathology Exam [[id number]] Collected: 03/18/21 1354    Specimen: Tissue from Fibroid, Uterus Updated: 03/18/21 1522        Imaging Results (Last 24 Hours)     ** No results found for the last 24 hours. **           Physician Progress Notes (last 24 hours) (Notes from 03/18/21 1108 through 03/19/21 1108)      Tuyet Alfonso MD at 03/19/21 0802          3/19/2021    Name:Bernice Camilo    MR#:[id number]     PROGRESS NOTE:  Post-Op      Subjective   41 y.o. yo Female, POD 1.  She reports nausea and pain.   Patient Active Problem List   Diagnosis   • Fibroid   • Intramural leiomyoma of uterus        Objective    Vitals  Temp:  Temp:  [97.3 °F (36.3 °C)-98.2 °F (36.8 °C)] 98.2 °F (36.8 °C)  Temp src: Oral  BP:  BP: (113-143)/(67-97) 113/74  Pulse:  Heart Rate:  [63-87] 72  RR:   Resp:  [14-24] 20    General Awake, alert, no distress  Abdomen Soft, non-distended, appropriately tender, +bs  Incision  Intact, no erythema or exudate  Extremities Calves NT bilaterally     I/O last 3 completed shifts:  In: 2400 [P.O.:1200; I.V.:1200]  Out: 3880 [Urine:3855; Blood:25]    LABS:   Lab Results   Component Value Date    WBC 5.29 03/17/2021    HGB 12.4 03/17/2021    HCT 39.2 03/17/2021    MCV 79.5 03/17/2021     (L) 03/17/2021             Assessment   1.  POD 1 from abdominal myomectomy    Plan: Will add phenergan for nausea.  Monitor voiding.  Await am labs.           Tuyet Alfonso MD  3/19/2021 08:02 EDT    Electronically signed by Tuyet Alfonso MD at 03/19/21 0803       Consult Notes (last 24 hours) (Notes from 03/18/21 1108 through 03/19/21 1108)    No notes of this type exist for this encounter.

## 2021-03-19 NOTE — PROGRESS NOTES
3/19/2021    Name:Bernice Camilo    MR#:[id number]     PROGRESS NOTE:  Post-Op      Subjective   41 y.o. yo Female, POD 1.  She reports nausea and pain.   Patient Active Problem List   Diagnosis   • Fibroid   • Intramural leiomyoma of uterus        Objective    Vitals  Temp:  Temp:  [97.3 °F (36.3 °C)-98.2 °F (36.8 °C)] 98.2 °F (36.8 °C)  Temp src: Oral  BP:  BP: (113-143)/(67-97) 113/74  Pulse:  Heart Rate:  [63-87] 72  RR:   Resp:  [14-24] 20    General Awake, alert, no distress  Abdomen Soft, non-distended, appropriately tender, +bs  Incision  Intact, no erythema or exudate  Extremities Calves NT bilaterally     I/O last 3 completed shifts:  In: 2400 [P.O.:1200; I.V.:1200]  Out: 3880 [Urine:3855; Blood:25]    LABS:   Lab Results   Component Value Date    WBC 5.29 03/17/2021    HGB 12.4 03/17/2021    HCT 39.2 03/17/2021    MCV 79.5 03/17/2021     (L) 03/17/2021             Assessment   1.  POD 1 from abdominal myomectomy    Plan: Will add phenergan for nausea.  Monitor voiding.  Await am labs.           Tuyet Alfonso MD  3/19/2021 08:02 EDT

## 2021-03-19 NOTE — TELEPHONE ENCOUNTER
Caller: KITA WITH [facility]    Relationship to patient: NURSE  Best call back number: [phone]  (PATIENT CALL BACK [phone])    New or established patient?  [x] New  [] Established    Date of discharge: 03/21/2021  Facility discharged from: [facility]  Diagnosis/Symptoms: FIBRYOID  Length of stay (If applicable):  03/18/2021 - 03/21/2021  Specialty Only: Did you see a [facility] provider?    [x] Yes  [] No  If so, who? DR MUNGUIA

## 2021-03-19 NOTE — PLAN OF CARE
Problem: Adult Inpatient Plan of Care  Goal: Plan of Care Review  3/19/2021 0223 by Dirk Hernandez, RN  Outcome: Ongoing, Progressing  Flowsheets  Taken 3/19/2021 0223 by Dirk Hernandez, RN  Progress: improving  Outcome Summary: VSS. Resting well. UOP adq. Bonilla out in am. Incision intact and dry. PRN pain and nausea. Continue to monitor.  Taken 3/18/2021 1636 by Karmen Moffett LPN  Plan of Care Reviewed With: patient   Goal Outcome Evaluation:     Progress: improving  Outcome Summary: VSS. Resting well. UOP adq. Bonilla out in am. Incision intact and dry. PRN pain and nausea. Continue to monitor.

## 2021-03-19 NOTE — PROGRESS NOTES
Continued Stay Note  JACLYN Pillai     Patient Name: Bernice Camilo  MRN: [id number]  Today's Date: 3/19/2021    Admit Date: 3/18/2021    Discharge Plan     Row Name 03/19/21 1134       Plan    Plan  Home    Patient/Family in Agreement with Plan  yes    Plan Comments  I talked with patient at bedside. She lives with a friend Duglas and Duglas's children in a house in [place] Patient works, independent PTA and plan home at time of discharge. CM following. Helene @ 6775    Final Discharge Disposition Code  01 - home or self-care        Discharge Codes    No documentation.             Brandy Jorgensen RN

## 2021-03-19 NOTE — PLAN OF CARE
Goal Outcome Evaluation:  Plan of Care Reviewed With: patient     Outcome Summary: VSS, UOP ADQ, BRUNO out this am, bladder scan once earlier with 62 residual, has later voided with any difficulty rest of day. Incisions dressings D&I. Pain managed with prn med. Nausea and vomited twice this morning, was managed with prn med. Will continue to monitor.

## 2021-03-20 VITALS
BODY MASS INDEX: 34.07 KG/M2 | OXYGEN SATURATION: 91 % | HEIGHT: 66 IN | RESPIRATION RATE: 20 BRPM | DIASTOLIC BLOOD PRESSURE: 62 MMHG | TEMPERATURE: 98.3 F | HEART RATE: 86 BPM | WEIGHT: 212 LBS | SYSTOLIC BLOOD PRESSURE: 102 MMHG

## 2021-03-20 LAB
BASOPHILS # BLD AUTO: 0.01 10*3/MM3 (ref 0–0.2)
BASOPHILS NFR BLD AUTO: 0.2 % (ref 0–1.5)
DEPRECATED RDW RBC AUTO: 42.5 FL (ref 37–54)
EOSINOPHIL # BLD AUTO: 0.02 10*3/MM3 (ref 0–0.4)
EOSINOPHIL NFR BLD AUTO: 0.3 % (ref 0.3–6.2)
ERYTHROCYTE [DISTWIDTH] IN BLOOD BY AUTOMATED COUNT: 14.6 % (ref 12.3–15.4)
GLUCOSE BLDC GLUCOMTR-MCNC: 105 MG/DL (ref 70–130)
HCT VFR BLD AUTO: 30.7 % (ref 34–46.6)
HGB BLD-MCNC: 9.6 G/DL (ref 12–15.9)
IMM GRANULOCYTES # BLD AUTO: 0.01 10*3/MM3 (ref 0–0.05)
IMM GRANULOCYTES NFR BLD AUTO: 0.2 % (ref 0–0.5)
LYMPHOCYTES # BLD AUTO: 1.25 10*3/MM3 (ref 0.7–3.1)
LYMPHOCYTES NFR BLD AUTO: 20.9 % (ref 19.6–45.3)
MCH RBC QN AUTO: 25.1 PG (ref 26.6–33)
MCHC RBC AUTO-ENTMCNC: 31.3 G/DL (ref 31.5–35.7)
MCV RBC AUTO: 80.2 FL (ref 79–97)
MONOCYTES # BLD AUTO: 0.44 10*3/MM3 (ref 0.1–0.9)
MONOCYTES NFR BLD AUTO: 7.4 % (ref 5–12)
NEUTROPHILS NFR BLD AUTO: 4.25 10*3/MM3 (ref 1.7–7)
NEUTROPHILS NFR BLD AUTO: 71 % (ref 42.7–76)
NRBC BLD AUTO-RTO: 0 /100 WBC (ref 0–0.2)
PLATELET # BLD AUTO: 114 10*3/MM3 (ref 140–450)
PMV BLD AUTO: 10.5 FL (ref 6–12)
RBC # BLD AUTO: 3.83 10*6/MM3 (ref 3.77–5.28)
WBC # BLD AUTO: 5.98 10*3/MM3 (ref 3.4–10.8)

## 2021-03-20 PROCEDURE — 85025 COMPLETE CBC W/AUTO DIFF WBC: CPT | Performed by: OBSTETRICS & GYNECOLOGY

## 2021-03-20 PROCEDURE — 82962 GLUCOSE BLOOD TEST: CPT

## 2021-03-20 RX ORDER — POLYETHYLENE GLYCOL 3350 17 G/17G
17 POWDER, FOR SOLUTION ORAL DAILY
Qty: 14 EACH | Refills: 0 | Status: SHIPPED | OUTPATIENT
Start: 2021-03-20 | End: 2021-06-22

## 2021-03-20 RX ORDER — GABAPENTIN 100 MG/1
100 CAPSULE ORAL 3 TIMES DAILY
Qty: 30 CAPSULE | Refills: 0 | Status: SHIPPED | OUTPATIENT
Start: 2021-03-20 | End: 2021-06-22

## 2021-03-20 RX ORDER — MELOXICAM 7.5 MG/1
7.5 TABLET ORAL DAILY
Qty: 30 TABLET | Refills: 0 | Status: SHIPPED | OUTPATIENT
Start: 2021-03-20 | End: 2021-04-19

## 2021-03-20 RX ORDER — OXYCODONE HYDROCHLORIDE 5 MG/1
5 TABLET ORAL EVERY 4 HOURS PRN
Qty: 20 TABLET | Refills: 0 | Status: SHIPPED | OUTPATIENT
Start: 2021-03-20 | End: 2021-06-22

## 2021-03-20 RX ORDER — ACETAMINOPHEN 325 MG/1
650 TABLET ORAL EVERY 4 HOURS PRN
Qty: 100 TABLET | Refills: 2 | Status: SHIPPED | OUTPATIENT
Start: 2021-03-20 | End: 2022-03-20

## 2021-03-20 RX ADMIN — DICYCLOMINE HYDROCHLORIDE 10 MG: 10 CAPSULE ORAL at 09:15

## 2021-03-20 RX ADMIN — ACETAMINOPHEN 1000 MG: 500 TABLET ORAL at 05:35

## 2021-03-20 RX ADMIN — METFORMIN HYDROCHLORIDE 1000 MG: 1000 TABLET ORAL at 09:15

## 2021-03-20 RX ADMIN — MELOXICAM 7.5 MG: 7.5 TABLET ORAL at 09:15

## 2021-03-20 RX ADMIN — POLYETHYLENE GLYCOL 3350 17 G: 17 POWDER, FOR SOLUTION ORAL at 09:15

## 2021-03-20 RX ADMIN — GABAPENTIN 100 MG: 100 CAPSULE ORAL at 09:15

## 2021-03-20 NOTE — PLAN OF CARE
Goal Outcome Evaluation:     Progress: improving     Voiding spontaneously and ambulating without issues. BM this am; passing flatus. Incision C/D/I. No pain or N/V reported.Tolerating PO. Discharging today.

## 2021-03-20 NOTE — PLAN OF CARE
Patient rested well.    Ambulated in halls.   Demonstrates use of I/S- could use reinforcement on proper technique.    Pain well controlled- denies need for pain medication.  Slept well  Voiding.    Incision CDI.   VSS.  Anticipate discharge today.

## 2021-03-20 NOTE — PROGRESS NOTES
3/20/2021    Name:Bernice Camilo    MR#:[id number]     PROGRESS NOTE:  Post-Op      Subjective   41 y.o. yo Female, POD 2, doing well. Pain well controlled.   Patient Active Problem List   Diagnosis   • Fibroid   • Intramural leiomyoma of uterus        Objective    Vitals  Temp:  Temp:  [98 °F (36.7 °C)-98.7 °F (37.1 °C)] 98.3 °F (36.8 °C)  Temp src: Oral  BP:  BP: ()/(56-72) 102/62  Pulse:  Heart Rate:  [84-93] 86  RR:   Resp:  [16-20] 20    General Awake, alert, no distress  Abdomen Soft, non-distended, appropriately tender, +bs  Incision  Intact, no erythema or exudate  Extremities Calves NT bilaterally     I/O last 3 completed shifts:  In: 1920 [P.O.:1920]  Out: 6601 [Urine:6600; Emesis/NG output:1]    LABS:   Lab Results   Component Value Date    WBC 6.60 03/19/2021    HGB 11.1 (L) 03/19/2021    HCT 35.4 03/19/2021    MCV 79.2 03/19/2021     (L) 03/19/2021             Assessment   1.  POD 2 from abdominal myomectomy    Plan: Doing well.    D/C home  Precautions reviewed.           Tuyet Alfonso MD  3/20/2021 10:07 EDT

## 2021-03-21 ENCOUNTER — READMISSION MANAGEMENT (OUTPATIENT)
Dept: CALL CENTER | Facility: HOSPITAL | Age: 41
End: 2021-03-21

## 2021-03-21 NOTE — OUTREACH NOTE
Prep Survey      Responses   [facility] patient discharged from?  [place]   Is LACE score < 7 ?  Yes   Emergency Room discharge w/ pulse ox?  No   Eligibility  [facility]   Date of Admission  03/18/21   Date of Discharge  03/20/21   Discharge Disposition  Home or Self Care   Discharge diagnosis  sp  abdominal myomectomy   Does the patient have one of the following disease processes/diagnoses(primary or secondary)?  General Surgery   Does the patient have Home health ordered?  No   Is there a DME ordered?  No   Prep survey completed?  Yes          Dulce Moss RN

## 2021-03-22 ENCOUNTER — TRANSITIONAL CARE MANAGEMENT TELEPHONE ENCOUNTER (OUTPATIENT)
Dept: CALL CENTER | Facility: HOSPITAL | Age: 41
End: 2021-03-22

## 2021-03-22 NOTE — DISCHARGE SUMMARY
JACLYN Pillai  Post Operative Discharge Summary      Patient: Bernice Camilo        MR#:[id number]    Admission  Diagnosis: Fibroids  Discharge Diagnosis:  Fibroids    Date of Admission: 3/18/2021  Date of Discharge:  3/20/2021     Procedures:  Abdominal myomectomy             Service:  Gynecology    Hospital Course:  Patient underwent  and remained in the hospital for 2 days.  During that time she remained afebrile and hemodynamically stable.  On the day of discharge, she was eating, ambulating and voiding without difficulty.      Labs:    Lab Results (last 24 hours)     Procedure Component Value Units Date/Time    CBC Auto Differential [[id number]]  (Abnormal) Collected: 03/20/21 0944    Specimen: Blood Updated: 03/20/21 1014     WBC 5.98 10*3/mm3      RBC 3.83 10*6/mm3      Hemoglobin 9.6 g/dL      Hematocrit 30.7 %      MCV 80.2 fL      MCH 25.1 pg      MCHC 31.3 g/dL      RDW 14.6 %      RDW-SD 42.5 fl      MPV 10.5 fL      Platelets 114 10*3/mm3      Neutrophil % 71.0 %      Lymphocyte % 20.9 %      Monocyte % 7.4 %      Eosinophil % 0.3 %      Basophil % 0.2 %      Immature Grans % 0.2 %      Neutrophils, Absolute 4.25 10*3/mm3      Lymphocytes, Absolute 1.25 10*3/mm3      Monocytes, Absolute 0.44 10*3/mm3      Eosinophils, Absolute 0.02 10*3/mm3      Basophils, Absolute 0.01 10*3/mm3      Immature Grans, Absolute 0.01 10*3/mm3      nRBC 0.0 /100 WBC     POC Glucose Once [[id number]]  (Normal) Collected: 03/20/21 0740    Specimen: Blood Updated: 03/20/21 0746     Glucose 105 mg/dL     POC Glucose Once [[id number]]  (Abnormal) Collected: 03/19/21 1950    Specimen: Blood Updated: 03/19/21 1952     Glucose 160 mg/dL     POC Glucose Once [[id number]]  (Normal) Collected: 03/19/21 1659    Specimen: Blood Updated: 03/19/21 1705     Glucose 102 mg/dL           Discharge Medications     Discharge Medications      New Medications      Instructions Start Date   gabapentin 100 MG capsule  Commonly known as:  NEURONTIN   100 mg, Oral, 3 Times Daily, Take on schedule x 7 days then as needed.      meloxicam 7.5 MG tablet  Commonly known as: Mobic   7.5 mg, Oral, Daily      oxyCODONE 5 MG immediate release tablet  Commonly known as: Roxicodone   5 mg, Oral, Every 4 Hours PRN      polyethylene glycol 17 g packet  Commonly known as: MiraLax   17 g, Oral, Daily, Use daily for 2 weeks following surgery to prevent constipation.  May hold or discontinue for loose stools.         Changes to Medications      Instructions Start Date   acetaminophen 325 MG tablet  Commonly known as: Tylenol  What changed:   · medication strength  · how much to take  · when to take this   650 mg, Oral, Every 4 Hours PRN         Continue These Medications      Instructions Start Date   aspirin 81 MG EC tablet   81 mg, Oral, Daily      atorvastatin 40 MG tablet  Commonly known as: LIPITOR   40 mg, Oral, Daily      dicyclomine 10 MG capsule  Commonly known as: BENTYL   10 mg, Oral, 2 times daily      lisinopril 5 MG tablet  Commonly known as: PRINIVIL,ZESTRIL   5 mg, Oral, Daily      metFORMIN 500 MG tablet  Commonly known as: GLUCOPHAGE   1,000 mg, Oral, 2 Times Daily With Meals      SITagliptin 100 MG tablet  Commonly known as: JANUVIA   100 mg, Oral, Daily         Stop These Medications    NAPROXEN SODIUM PO            Discharge Disposition:  To Home    Discharge Condition:  Stable    Discharge Diet: regular    Activity at Discharge: pelvic rest    Follow-up Appointments  4 weeks    Tuyet Alfonso MD  03/22/21  09:00 EDT

## 2021-03-22 NOTE — OUTREACH NOTE
Call Center TCM Note      Responses   [facility] patient discharged from?  [place]   Does the patient have one of the following disease processes/diagnoses(primary or secondary)?  General Surgery   TCM attempt successful?  Yes   Call start time  1256   Call end time  1305   Discharge diagnosis   fibroids, abdominal myomectomy   Is patient permission given to speak with other caregiver?  No   Meds reviewed with patient/caregiver?  Yes   Is the patient having any side effects they believe may be caused by any medication additions or changes?  Yes   Side effects comments   Patient reports pain medication makes her dizzy feeling.    Does the patient have all medications related to this admission filled (includes all antibiotics, pain medications, etc.)  Yes   Is the patient taking all medications as directed (includes completed medication regime)?  Yes   Does the patient have a follow up appointment scheduled with their surgeon?  No   What is preventing the patient from scheduling follow up appointments?  Haven't had time   Nursing Interventions  Educated patient on importance of making appointment, Advised patient to make appointment [Advised to call for appt ]   Has the patient kept scheduled appointments due by today?  N/A   Comments  New Patient with Eugenia Gomes MD Thursday Mar 25, 2021 10:30 AM   Has home health visited the patient within 72 hours of discharge?  N/A   Psychosocial issues?  No   Did the patient receive a copy of their discharge instructions?  Yes   Nursing interventions  Reviewed instructions with patient   What is the patient's perception of their health status since discharge?  Improving [Patient reports that she does have some bruising that she is concerned about. ]   Nursing interventions  Nurse provided patient education, Advised patient to call provider [Advised to contact Dr Alfonso's office to report increased bruising and vaginal bleeding. ]   Is the patient /caregiver  able to teach back basic post-op care?  No tub bath, swimming, or hot tub until instructed by MD, Keep incision areas clean,dry and protected   Is the patient/caregiver able to teach back signs and symptoms of incisional infection?  Increased redness, swelling or pain at the incisonal site, Increased drainage or bleeding, Incisional warmth, Pus or odor from incision, Fever   Is the patient/caregiver able to teach back steps to recovery at home?  Set small, achievable goals for return to baseline health, Rest and rebuild strength, gradually increase activity   If the patient is a current smoker, are they able to teach back resources for cessation?  Not a smoker   Is the patient/caregiver able to teach back the hierarchy of who to call/visit for symptoms/problems? PCP, Specialist, Home health nurse, Urgent Care, ED, 911  Yes   TCM call completed?  Yes          Beth Guerra RN    3/22/2021, 13:05 EDT

## 2021-03-25 ENCOUNTER — OFFICE VISIT (OUTPATIENT)
Dept: FAMILY MEDICINE CLINIC | Facility: CLINIC | Age: 41
End: 2021-03-25

## 2021-03-25 VITALS
DIASTOLIC BLOOD PRESSURE: 74 MMHG | SYSTOLIC BLOOD PRESSURE: 122 MMHG | BODY MASS INDEX: 33.91 KG/M2 | HEIGHT: 66 IN | OXYGEN SATURATION: 98 % | WEIGHT: 211 LBS | HEART RATE: 75 BPM

## 2021-03-25 DIAGNOSIS — I10 ESSENTIAL HYPERTENSION: ICD-10-CM

## 2021-03-25 DIAGNOSIS — E11.9 TYPE 2 DIABETES MELLITUS WITHOUT COMPLICATION, WITHOUT LONG-TERM CURRENT USE OF INSULIN (HCC): ICD-10-CM

## 2021-03-25 DIAGNOSIS — D64.9 POSTOPERATIVE ANEMIA: Primary | ICD-10-CM

## 2021-03-25 PROCEDURE — 99204 OFFICE O/P NEW MOD 45 MIN: CPT | Performed by: FAMILY MEDICINE

## 2021-03-25 RX ORDER — FERROUS SULFATE TAB EC 324 MG (65 MG FE EQUIVALENT) 324 (65 FE) MG
324 TABLET DELAYED RESPONSE ORAL
Qty: 30 TABLET | Refills: 1 | Status: SHIPPED | OUTPATIENT
Start: 2021-03-25 | End: 2021-04-16

## 2021-03-25 NOTE — PROGRESS NOTES
"Chief Complaint  Establish Care (follow up from surgery)    Subjective          Bernice Camilo presents to [facility]  History of Present Illness    Admission  Diagnosis: Fibroids  Discharge Diagnosis:  Fibroids     Date of Admission: 3/18/2021  Date of Discharge:  3/20/2021      Procedures:  Abdominal myomectomy                 Service:  Gynecology     Hospital Course:  Patient underwent  and remained in the hospital for 2 days.  During that time she remained afebrile and hemodynamically stable.  On the day of discharge, she was eating, ambulating and voiding without difficulty.      3/25/21: She doesn't take multivitamin. She has difficulty sleeping. She has bruising on her abdomen. No fever or chills. She is weak and tired sometimes. Difficulty sleeping. She has noticed a diffierent complection.       Objective   Vital Signs:   /74   Pulse 75   Ht 167.6 cm (66\")   Wt 95.7 kg (211 lb)   SpO2 98%   BMI 34.06 kg/m²     Physical Exam  Vitals reviewed.   Constitutional:       General: She is not in acute distress.     Appearance: She is obese. She is not ill-appearing.   Cardiovascular:      Rate and Rhythm: Normal rate and regular rhythm.   Pulmonary:      Effort: Pulmonary effort is normal.      Breath sounds: Normal breath sounds.   Abdominal:      Palpations: Abdomen is soft.       Neurological:      Mental Status: She is alert.   Psychiatric:         Mood and Affect: Mood normal.         Behavior: Behavior normal.         Thought Content: Thought content normal.         Judgment: Judgment normal.        Result Review :   The following data was reviewed by: Eugenia Gomes MD on 03/25/2021:             CBC Auto Differential (03/20/2021 09:44)  Potassium (03/17/2021 13:32)  Urinalysis With Culture If Indicated - Urine, Clean Catch (03/17/2021 12:52)  Hemoglobin A1c (03/17/2021 12:52)  COVID PRE-OP / PRE-PROCEDURE SCREENING ORDER (NO ISOLATION) - Swab, " Oropharynx (03/16/2021 14:12)  Prior PCP records requested.    Assessment and Plan    Diagnoses and all orders for this visit:    1. Postoperative anemia (Primary)  -     ferrous sulfate 324 (65 Fe) MG tablet delayed-release EC tablet; Take 1 tablet by mouth Daily With Breakfast.  Dispense: 30 tablet; Refill: 1  New. Discussed with patient lab results in detail.  Initiate iron once a day for 2 months.  Her symptoms of fatigue could be related to anemia and or recovery from major surgery.  2. Type 2 diabetes mellitus without complication, without long-term current use of insulin (CMS/[facility])  Patient's A1c in the hospital was controlled with her current medications.  Follow-up in 3 months for recheck.  3. Essential hypertension  Blood pressure controlled.      Discussed with patient I would not refill her pain medications.  She needs to follow-up with her surgeon's office.    Follow Up   Return in about 3 months (around 6/25/2021) for Office visit diabetes, anemia.  Patient was given instructions and counseling regarding her condition or for health maintenance advice. Please see specific information pulled into the AVS if appropriate.     Electronically signed by Eugenia Gomes MD, 03/25/21, 10:56 AM EDT.

## 2021-03-26 ENCOUNTER — TRANSCRIBE ORDERS (OUTPATIENT)
Dept: LAB | Facility: HOSPITAL | Age: 41
End: 2021-03-26

## 2021-03-26 ENCOUNTER — LAB (OUTPATIENT)
Dept: LAB | Facility: HOSPITAL | Age: 41
End: 2021-03-26

## 2021-03-26 DIAGNOSIS — R30.0 DYSURIA: ICD-10-CM

## 2021-03-26 DIAGNOSIS — R30.0 DYSURIA: Primary | ICD-10-CM

## 2021-03-26 LAB
BASOPHILS # BLD AUTO: 0 10*3/MM3 (ref 0–0.2)
BASOPHILS NFR BLD AUTO: 0 % (ref 0–1.5)
BILIRUB UR QL STRIP: NEGATIVE
CLARITY UR: CLEAR
COLOR UR: YELLOW
DEPRECATED RDW RBC AUTO: 42 FL (ref 37–54)
EOSINOPHIL # BLD AUTO: 0.06 10*3/MM3 (ref 0–0.4)
EOSINOPHIL NFR BLD AUTO: 0.9 % (ref 0.3–6.2)
ERYTHROCYTE [DISTWIDTH] IN BLOOD BY AUTOMATED COUNT: 14.5 % (ref 12.3–15.4)
GLUCOSE UR STRIP-MCNC: NEGATIVE MG/DL
HCT VFR BLD AUTO: 31.4 % (ref 34–46.6)
HGB BLD-MCNC: 9.9 G/DL (ref 12–15.9)
HGB UR QL STRIP.AUTO: NEGATIVE
IMM GRANULOCYTES # BLD AUTO: 0.04 10*3/MM3 (ref 0–0.05)
IMM GRANULOCYTES NFR BLD AUTO: 0.6 % (ref 0–0.5)
KETONES UR QL STRIP: NEGATIVE
LEUKOCYTE ESTERASE UR QL STRIP.AUTO: NEGATIVE
LYMPHOCYTES # BLD AUTO: 1.23 10*3/MM3 (ref 0.7–3.1)
LYMPHOCYTES NFR BLD AUTO: 19.1 % (ref 19.6–45.3)
MCH RBC QN AUTO: 25.4 PG (ref 26.6–33)
MCHC RBC AUTO-ENTMCNC: 31.5 G/DL (ref 31.5–35.7)
MCV RBC AUTO: 80.5 FL (ref 79–97)
MONOCYTES # BLD AUTO: 0.35 10*3/MM3 (ref 0.1–0.9)
MONOCYTES NFR BLD AUTO: 5.4 % (ref 5–12)
NEUTROPHILS NFR BLD AUTO: 4.75 10*3/MM3 (ref 1.7–7)
NEUTROPHILS NFR BLD AUTO: 74 % (ref 42.7–76)
NITRITE UR QL STRIP: NEGATIVE
NRBC BLD AUTO-RTO: 0 /100 WBC (ref 0–0.2)
PH UR STRIP.AUTO: >=9 [PH] (ref 5–8)
PLATELET # BLD AUTO: 205 10*3/MM3 (ref 140–450)
PMV BLD AUTO: 10.6 FL (ref 6–12)
PROT UR QL STRIP: ABNORMAL
RBC # BLD AUTO: 3.9 10*6/MM3 (ref 3.77–5.28)
SP GR UR STRIP: 1.02 (ref 1–1.03)
UROBILINOGEN UR QL STRIP: ABNORMAL
WBC # BLD AUTO: 6.43 10*3/MM3 (ref 3.4–10.8)

## 2021-03-26 PROCEDURE — 85025 COMPLETE CBC W/AUTO DIFF WBC: CPT

## 2021-03-26 PROCEDURE — 81003 URINALYSIS AUTO W/O SCOPE: CPT

## 2021-03-26 PROCEDURE — 87086 URINE CULTURE/COLONY COUNT: CPT

## 2021-03-26 PROCEDURE — 36415 COLL VENOUS BLD VENIPUNCTURE: CPT

## 2021-03-27 LAB — BACTERIA SPEC AEROBE CULT: NO GROWTH

## 2021-04-16 DIAGNOSIS — D64.9 POSTOPERATIVE ANEMIA: ICD-10-CM

## 2021-04-16 RX ORDER — FERROUS SULFATE TAB EC 324 MG (65 MG FE EQUIVALENT) 324 (65 FE) MG
TABLET DELAYED RESPONSE ORAL
Qty: 30 TABLET | Refills: 1 | Status: SHIPPED | OUTPATIENT
Start: 2021-04-16 | End: 2021-05-17

## 2021-04-21 ENCOUNTER — TRANSCRIBE ORDERS (OUTPATIENT)
Dept: LAB | Facility: HOSPITAL | Age: 41
End: 2021-04-21

## 2021-04-21 ENCOUNTER — LAB (OUTPATIENT)
Dept: LAB | Facility: HOSPITAL | Age: 41
End: 2021-04-21

## 2021-04-21 DIAGNOSIS — D64.9 ANEMIA, UNSPECIFIED TYPE: ICD-10-CM

## 2021-04-21 DIAGNOSIS — D64.9 ANEMIA, UNSPECIFIED TYPE: Primary | ICD-10-CM

## 2021-04-21 LAB
DEPRECATED RDW RBC AUTO: 43.9 FL (ref 37–54)
ERYTHROCYTE [DISTWIDTH] IN BLOOD BY AUTOMATED COUNT: 15.3 % (ref 12.3–15.4)
HCT VFR BLD AUTO: 39.3 % (ref 34–46.6)
HGB BLD-MCNC: 12.3 G/DL (ref 12–15.9)
MCH RBC QN AUTO: 25.3 PG (ref 26.6–33)
MCHC RBC AUTO-ENTMCNC: 31.3 G/DL (ref 31.5–35.7)
MCV RBC AUTO: 80.7 FL (ref 79–97)
PLATELET # BLD AUTO: 147 10*3/MM3 (ref 140–450)
PMV BLD AUTO: 11.2 FL (ref 6–12)
RBC # BLD AUTO: 4.87 10*6/MM3 (ref 3.77–5.28)
WBC # BLD AUTO: 4.77 10*3/MM3 (ref 3.4–10.8)

## 2021-04-21 PROCEDURE — 85027 COMPLETE CBC AUTOMATED: CPT

## 2021-04-21 PROCEDURE — 36415 COLL VENOUS BLD VENIPUNCTURE: CPT

## 2021-05-03 ENCOUNTER — OFFICE VISIT (OUTPATIENT)
Dept: FAMILY MEDICINE CLINIC | Facility: CLINIC | Age: 41
End: 2021-05-03

## 2021-05-03 VITALS
BODY MASS INDEX: 33.68 KG/M2 | HEIGHT: 66 IN | OXYGEN SATURATION: 97 % | DIASTOLIC BLOOD PRESSURE: 86 MMHG | WEIGHT: 209.6 LBS | SYSTOLIC BLOOD PRESSURE: 118 MMHG | HEART RATE: 83 BPM

## 2021-05-03 DIAGNOSIS — M70.52 BURSITIS OF LEFT KNEE, UNSPECIFIED BURSA: ICD-10-CM

## 2021-05-03 DIAGNOSIS — L81.9 HYPERPIGMENTATION: ICD-10-CM

## 2021-05-03 DIAGNOSIS — H92.01 OTALGIA, RIGHT: Primary | ICD-10-CM

## 2021-05-03 DIAGNOSIS — K08.89 PAIN, DENTAL: ICD-10-CM

## 2021-05-03 PROCEDURE — 99214 OFFICE O/P EST MOD 30 MIN: CPT | Performed by: FAMILY MEDICINE

## 2021-05-03 RX ORDER — DICLOFENAC SODIUM 75 MG/1
75 TABLET, DELAYED RELEASE ORAL 2 TIMES DAILY PRN
Qty: 60 TABLET | Refills: 2 | Status: SHIPPED | OUTPATIENT
Start: 2021-05-03 | End: 2021-08-24 | Stop reason: SDUPTHER

## 2021-05-03 NOTE — PROGRESS NOTES
"Chief Complaint  Earache (right ear pain, about a week), Knee Pain (left knee pain, has about a going on for about a week as well), and Skin Problem (place on her face that she would like for you to take a look at, in between eyebrows)    Subjective     {Problem List  Visit Diagnosis   Encounters  Notes  Medications  Labs  Result Review Imaging  Media :23}     Bernice Camilo presents to [facility]  Earache   There is pain in the right ear. This is a new problem. The current episode started in the past 7 days. There has been no fever. Associated symptoms include a rash. Pertinent negatives include no ear discharge.   Knee Pain   The incident occurred 5 to 7 days ago. The pain is present in the left knee.   Rash  This is a new problem. The affected locations include the face.     Ear pain worse with chewing and yawning. She has to chew on left side, but not chewing too good.     Left knee pain while laying on her side. No known injury. No swelling.     For the past month she has skin lesion and not improving. She scratches it when it itches. No known injury. She had tried to shave between her eyebrows.         Objective   Vital Signs:   /86 (BP Location: Left arm, Patient Position: Sitting, Cuff Size: Adult)   Pulse 83   Ht 167.6 cm (66\")   Wt 95.1 kg (209 lb 9.6 oz)   SpO2 97%   BMI 33.83 kg/m²     Physical Exam  Vitals reviewed.   Constitutional:       General: She is not in acute distress.     Appearance: She is obese. She is not ill-appearing.   HENT:      Head: Normocephalic and atraumatic.      Jaw: No trismus, tenderness or malocclusion.      Comments: TMJ without clicking or popping     Right Ear: Tympanic membrane and ear canal normal.      Left Ear: Tympanic membrane and ear canal normal.      Mouth/Throat:      Mouth: Mucous membranes are moist. No oral lesions.      Dentition: No dental tenderness, gingival swelling or dental abscesses. "   Cardiovascular:      Rate and Rhythm: Normal rate and regular rhythm.   Pulmonary:      Effort: Pulmonary effort is normal.      Breath sounds: Normal breath sounds.   Musculoskeletal:      Left knee: No erythema or ecchymosis.        Legs:    Skin:         Neurological:      Mental Status: She is alert.        Result Review :                 Assessment and Plan    Diagnoses and all orders for this visit:    1. Otalgia, right (Primary)  -     diclofenac (VOLTAREN) 75 MG EC tablet; Take 1 tablet by mouth 2 (Two) Times a Day As Needed (pain).  Dispense: 60 tablet; Refill: 2  No signs of ear infection needing treatment.  Possibly referred pain from dental.  Diclofenac as needed for pain.  2. Pain, dental  -     Ambulatory Referral to Dentistry  -     diclofenac (VOLTAREN) 75 MG EC tablet; Take 1 tablet by mouth 2 (Two) Times a Day As Needed (pain).  Dispense: 60 tablet; Refill: 2  Possibly referred pain from dental.  Diclofenac as needed for pain.  3. Hyperpigmentation  -     Ambulatory Referral to Dermatology  Further evaluation with dermatology.  4. Bursitis of left knee, unspecified bursa  -     diclofenac (VOLTAREN) 75 MG EC tablet; Take 1 tablet by mouth 2 (Two) Times a Day As Needed (pain).  Dispense: 60 tablet; Refill: 2  New.  Treat with NSAIDs as needed for pain.      Follow Up   Return in about 7 weeks (around 6/22/2021) for as scheduled.  Patient was given instructions and counseling regarding her condition or for health maintenance advice. Please see specific information pulled into the AVS if appropriate.     Electronically signed by Eugenia Gomes MD, 05/03/21, 2:23 PM EDT.

## 2021-05-13 DIAGNOSIS — D64.9 POSTOPERATIVE ANEMIA: ICD-10-CM

## 2021-05-17 RX ORDER — FERROUS SULFATE TAB EC 324 MG (65 MG FE EQUIVALENT) 324 (65 FE) MG
TABLET DELAYED RESPONSE ORAL
Qty: 30 TABLET | Refills: 0 | Status: SHIPPED | OUTPATIENT
Start: 2021-05-17 | End: 2021-06-09

## 2021-06-08 DIAGNOSIS — D64.9 POSTOPERATIVE ANEMIA: ICD-10-CM

## 2021-06-08 NOTE — TELEPHONE ENCOUNTER
Next Office Visit: 6/22/2021  Last Office Visit: 5/3/2021     Last Labs:3/26/2021     Medication: FERROUS SULF  MG TABLET  Last Refill Date: 5/17/2021  Quantity: 30  Refills: 0    Pharmacy: [id number] - [place], [place] - [address] AT [address] [facility] [phone] [facility] [phone] FX

## 2021-06-09 RX ORDER — FERROUS SULFATE TAB EC 324 MG (65 MG FE EQUIVALENT) 324 (65 FE) MG
TABLET DELAYED RESPONSE ORAL
Qty: 30 TABLET | Refills: 0 | Status: SHIPPED | OUTPATIENT
Start: 2021-06-09 | End: 2021-06-22

## 2021-06-22 ENCOUNTER — OFFICE VISIT (OUTPATIENT)
Dept: FAMILY MEDICINE CLINIC | Facility: CLINIC | Age: 41
End: 2021-06-22

## 2021-06-22 ENCOUNTER — LAB (OUTPATIENT)
Dept: LAB | Facility: HOSPITAL | Age: 41
End: 2021-06-22

## 2021-06-22 VITALS
OXYGEN SATURATION: 99 % | DIASTOLIC BLOOD PRESSURE: 64 MMHG | WEIGHT: 212 LBS | HEART RATE: 81 BPM | SYSTOLIC BLOOD PRESSURE: 112 MMHG | BODY MASS INDEX: 34.07 KG/M2 | HEIGHT: 66 IN | TEMPERATURE: 96.9 F

## 2021-06-22 DIAGNOSIS — N89.8 VAGINAL ODOR: ICD-10-CM

## 2021-06-22 DIAGNOSIS — D64.9 POSTOPERATIVE ANEMIA: ICD-10-CM

## 2021-06-22 DIAGNOSIS — E11.9 TYPE 2 DIABETES MELLITUS WITHOUT COMPLICATION, WITHOUT LONG-TERM CURRENT USE OF INSULIN (HCC): Primary | ICD-10-CM

## 2021-06-22 DIAGNOSIS — B37.9 CANDIDIASIS: ICD-10-CM

## 2021-06-22 LAB
HBA1C MFR BLD: 6.5 %
HGB BLDA-MCNC: 12.9 G/DL (ref 12–17)

## 2021-06-22 PROCEDURE — 83036 HEMOGLOBIN GLYCOSYLATED A1C: CPT | Performed by: FAMILY MEDICINE

## 2021-06-22 PROCEDURE — 87798 DETECT AGENT NOS DNA AMP: CPT

## 2021-06-22 PROCEDURE — 85018 HEMOGLOBIN: CPT | Performed by: FAMILY MEDICINE

## 2021-06-22 PROCEDURE — 99214 OFFICE O/P EST MOD 30 MIN: CPT | Performed by: FAMILY MEDICINE

## 2021-06-22 PROCEDURE — 87591 N.GONORRHOEAE DNA AMP PROB: CPT

## 2021-06-22 PROCEDURE — 87661 TRICHOMONAS VAGINALIS AMPLIF: CPT

## 2021-06-22 PROCEDURE — 87801 DETECT AGNT MULT DNA AMPLI: CPT

## 2021-06-22 PROCEDURE — 87491 CHLMYD TRACH DNA AMP PROBE: CPT

## 2021-06-22 RX ORDER — ATORVASTATIN CALCIUM 40 MG/1
40 TABLET, FILM COATED ORAL DAILY
Qty: 90 TABLET | Refills: 3 | Status: SHIPPED | OUTPATIENT
Start: 2021-06-22 | End: 2022-08-29

## 2021-06-22 RX ORDER — NYSTATIN 100000 [USP'U]/G
POWDER TOPICAL 4 TIMES DAILY
Qty: 60 G | Refills: 5 | Status: SHIPPED | OUTPATIENT
Start: 2021-06-22 | End: 2022-05-13

## 2021-06-22 RX ORDER — BLOOD SUGAR DIAGNOSTIC
STRIP MISCELLANEOUS
COMMUNITY
Start: 2021-05-04 | End: 2022-09-23

## 2021-06-22 RX ORDER — METFORMIN HYDROCHLORIDE 500 MG/1
1000 TABLET, EXTENDED RELEASE ORAL EVERY EVENING
Qty: 180 TABLET | Refills: 1 | Status: SHIPPED | OUTPATIENT
Start: 2021-06-22 | End: 2021-09-22 | Stop reason: DRUGHIGH

## 2021-06-22 RX ORDER — METFORMIN HYDROCHLORIDE 500 MG/1
TABLET, EXTENDED RELEASE ORAL
COMMUNITY
Start: 2021-05-20 | End: 2021-06-22 | Stop reason: ALTCHOICE

## 2021-06-22 NOTE — PROGRESS NOTES
"Chief Complaint  Diabetes (3 month follow up ) and Rash (She has been expereincing irritation on her crotch. This occured about a week ago as well as a abnormal smell. SHe would like to have evalauted further)    Subjective          Bernice Camilo presents to [facility]  Diabetes  She presents for her follow-up diabetic visit. She has type 2 diabetes mellitus. Current diabetic treatment includes oral agent (monotherapy).   Rash  This is a new problem.      Last week she felt itching in groin. After menses. Vaginal odor.     She had bleeding from her gums. Spiting in sink she sees blood. Has trouble finding a dentist.     She has a skin test next month.     She would like to take fewer medications if possible.    Objective   Vital Signs:   /64 (Cuff Size: Large Adult)   Pulse 81   Temp 96.9 °F (36.1 °C)   Ht 167.6 cm (66\")   Wt 96.2 kg (212 lb)   SpO2 99%   BMI 34.22 kg/m²     Vitals:    06/22/21 1606 06/22/21 1632   BP: 160/80 112/64   Cuff Size:  Large Adult   Pulse: 81    Temp: 96.9 °F (36.1 °C)    SpO2: 99%    Weight: 96.2 kg (212 lb)    Height: 167.6 cm (66\")    PainSc:   4        Physical Exam  Vitals reviewed. Exam conducted with a chaperone present.   Constitutional:       General: She is not in acute distress.     Appearance: She is obese. She is not ill-appearing.   Cardiovascular:      Rate and Rhythm: Normal rate and regular rhythm.   Pulmonary:      Effort: Pulmonary effort is normal.      Breath sounds: Normal breath sounds.   Genitourinary:     General: Normal vulva.      Exam position: Lithotomy position.      Labia:         Right: No rash.         Left: No rash.       Vagina: No vaginal discharge, erythema, bleeding or lesions.   Skin:         Neurological:      Mental Status: She is alert.        Result Review :   The following data was reviewed by: Eugenia Gomes MD on 06/22/2021:           Results for orders placed or performed in " visit on 06/22/21   POC Glycosylated Hemoglobin (Hb A1C)    Specimen: Blood   Result Value Ref Range    Hemoglobin A1C 6.5 %   POC Hemoglobin    Specimen: Blood   Result Value Ref Range    Hemoglobin 12.9 12.0 - 17.0 g/dL          Assessment and Plan    Diagnoses and all orders for this visit:    1. Type 2 diabetes mellitus without complication, without long-term current use of insulin (CMS/[facility]) (Primary)  -     POC Glycosylated Hemoglobin (Hb A1C)  -     atorvastatin (LIPITOR) 40 MG tablet; Take 1 tablet by mouth Daily.  Dispense: 90 tablet; Refill: 3  -     metFORMIN ER (GLUCOPHAGE-XR) 500 MG 24 hr tablet; Take 2 tablets by mouth Every Evening.  Dispense: 180 tablet; Refill: 1  -     SITagliptin (JANUVIA) 100 MG tablet; Take 1 tablet by mouth Daily.  Dispense: 90 tablet; Refill: 1  Well-controlled.  Continue Metformin and Januvia.  Discussed with patient having diabetes would need to continue atorvastatin to reduce heart attack and stroke risk.  Her blood pressure did improve on recheck and she was on a fairly low dose of lisinopril.  At this time will discontinue lisinopril and reassess blood pressure at her next visit.  2. Postoperative anemia  -     POC Hemoglobin  Anemia improved from 9.9.  She may discontinue iron supplementation.  3. Candidiasis  -     nystatin (MYCOSTATIN) 218222 UNIT/GM powder; Apply  topically to the appropriate area as directed 4 (Four) Times a Day. For up to 2 weeks  Dispense: 60 g; Refill: 5  New.  Treat with nystatin powder.  4. Vaginal odor  -     NuSwab VG+ - Swab, Vagina; Future  Check labs and defer treatment unless positive.      Follow Up   Return in about 3 months (around 9/22/2021) for Office visit diabetes, HTN .  Patient was given instructions and counseling regarding her condition or for health maintenance advice. Please see specific information pulled into the AVS if appropriate.     Electronically signed by Eugenia Gomes MD, 06/22/21, 5:34 PM EDT.

## 2021-06-26 LAB
A VAGINAE DNA VAG QL NAA+PROBE: ABNORMAL SCORE
BVAB2 DNA VAG QL NAA+PROBE: ABNORMAL SCORE
C ALBICANS DNA VAG QL NAA+PROBE: NEGATIVE
C GLABRATA DNA VAG QL NAA+PROBE: NEGATIVE
C TRACH DNA VAG QL NAA+PROBE: NEGATIVE
MEGA1 DNA VAG QL NAA+PROBE: ABNORMAL SCORE
N GONORRHOEA DNA VAG QL NAA+PROBE: NEGATIVE
T VAGINALIS DNA VAG QL NAA+PROBE: NEGATIVE

## 2021-06-28 ENCOUNTER — TELEPHONE (OUTPATIENT)
Dept: FAMILY MEDICINE CLINIC | Facility: CLINIC | Age: 41
End: 2021-06-28

## 2021-06-28 RX ORDER — METRONIDAZOLE 500 MG/1
500 TABLET ORAL 2 TIMES DAILY
Qty: 14 TABLET | Refills: 0 | Status: SHIPPED | OUTPATIENT
Start: 2021-06-28 | End: 2021-07-01 | Stop reason: SDUPTHER

## 2021-06-28 NOTE — TELEPHONE ENCOUNTER
Bacterial vaginosis present, not a yeast infection. Treat with flagyl 2 times a day for 7 days. Avoid sex and alcohol while on antibiotics. Not a STD.

## 2021-06-28 NOTE — TELEPHONE ENCOUNTER
Called and spoke with pt, gave pt instruction on information that was sent through by EDS , pt understood, had no further questions.

## 2021-07-01 ENCOUNTER — TELEPHONE (OUTPATIENT)
Dept: FAMILY MEDICINE CLINIC | Facility: CLINIC | Age: 41
End: 2021-07-01

## 2021-07-01 RX ORDER — METRONIDAZOLE 500 MG/1
500 TABLET ORAL 2 TIMES DAILY
Qty: 14 TABLET | Refills: 0 | Status: SHIPPED | OUTPATIENT
Start: 2021-07-01 | End: 2021-07-08

## 2021-07-01 NOTE — TELEPHONE ENCOUNTER
Patient called saying she lost the Flagyl that she was prescribed and is requesting a refill. She said she has only taken it once.

## 2021-07-14 DIAGNOSIS — D64.9 POSTOPERATIVE ANEMIA: ICD-10-CM

## 2021-07-14 RX ORDER — FERROUS SULFATE TAB EC 324 MG (65 MG FE EQUIVALENT) 324 (65 FE) MG
TABLET DELAYED RESPONSE ORAL
Qty: 30 TABLET | Refills: 2 | Status: SHIPPED | OUTPATIENT
Start: 2021-07-14 | End: 2021-07-28

## 2021-07-19 ENCOUNTER — TELEPHONE (OUTPATIENT)
Dept: FAMILY MEDICINE CLINIC | Facility: CLINIC | Age: 41
End: 2021-07-19

## 2021-07-19 NOTE — TELEPHONE ENCOUNTER
PATIENT CALLED TO REQUEST REFILL ON ANTIBIOTIC BUT DID NOT KNOW NAME OF MEDICATION.  THIS WAS TO TREAT AN INFECTION IN STOMACH. PATIENT IS OUT OF MEDICATION     [id number] - [place], [place] - [address] AT [facility] - [phone]

## 2021-07-20 NOTE — TELEPHONE ENCOUNTER
Called PT she states when she urinates she has a strong odor and is painful. Would like same antibiotic called in as last time.

## 2021-07-20 NOTE — TELEPHONE ENCOUNTER
Please call patient for more information.  I have not prescribed antibiotics for her stomach.  The last antibiotic prescription metronidazole was for bacterial vaginosis.  If she is having symptoms of an infection please call patient to schedule an office visit with me.

## 2021-07-21 NOTE — TELEPHONE ENCOUNTER
Attempted to contact, no answer. Left detailed message stating that she need to be re-evaluated before receiving further antibiotics and to call our office to make an upcoming appt    Hub can relay, document and schedule upcoming appt.

## 2021-07-21 NOTE — TELEPHONE ENCOUNTER
Antibiotic prescriptions are not sent in over the phone.  She needs to schedule an office visit or go to urgent care.

## 2021-07-22 NOTE — TELEPHONE ENCOUNTER
Called and spoke with patient and informed of message first available was 7/26 @ 10:15 but she was not able to do that and did not want to see another provider, advised she can go to the urgent care as a walk in patient and be seen. Patient understands

## 2021-07-28 PROCEDURE — 87086 URINE CULTURE/COLONY COUNT: CPT | Performed by: FAMILY MEDICINE

## 2021-08-24 ENCOUNTER — OFFICE VISIT (OUTPATIENT)
Dept: FAMILY MEDICINE CLINIC | Facility: CLINIC | Age: 41
End: 2021-08-24

## 2021-08-24 VITALS
HEART RATE: 81 BPM | HEIGHT: 66 IN | BODY MASS INDEX: 34.58 KG/M2 | SYSTOLIC BLOOD PRESSURE: 120 MMHG | OXYGEN SATURATION: 98 % | DIASTOLIC BLOOD PRESSURE: 80 MMHG | WEIGHT: 215.2 LBS

## 2021-08-24 DIAGNOSIS — M70.52 BURSITIS OF LEFT KNEE, UNSPECIFIED BURSA: ICD-10-CM

## 2021-08-24 DIAGNOSIS — R07.89 OTHER CHEST PAIN: ICD-10-CM

## 2021-08-24 DIAGNOSIS — Z20.822 EXPOSURE TO COVID-19 VIRUS: Primary | ICD-10-CM

## 2021-08-24 DIAGNOSIS — B35.6 TINEA CRURIS: ICD-10-CM

## 2021-08-24 PROCEDURE — 99214 OFFICE O/P EST MOD 30 MIN: CPT | Performed by: FAMILY MEDICINE

## 2021-08-24 PROCEDURE — U0004 COV-19 TEST NON-CDC HGH THRU: HCPCS | Performed by: FAMILY MEDICINE

## 2021-08-24 RX ORDER — CLOTRIMAZOLE 1 G/ML
SOLUTION TOPICAL 2 TIMES DAILY
Qty: 60 ML | Refills: 3 | Status: SHIPPED | OUTPATIENT
Start: 2021-08-24 | End: 2022-02-11 | Stop reason: SDUPTHER

## 2021-08-24 RX ORDER — DICLOFENAC SODIUM 75 MG/1
75 TABLET, DELAYED RELEASE ORAL 2 TIMES DAILY PRN
Qty: 60 TABLET | Refills: 2 | Status: SHIPPED | OUTPATIENT
Start: 2021-08-24 | End: 2021-12-15

## 2021-08-24 NOTE — PROGRESS NOTES
"Chief Complaint  Rash (Pt states that she has had a rash for a 1 month.) and Chest Pain (Pt statest that her daughter tested postive for covid this morning and has been having chest pain)    Subjective          Bernice Camilo presents to [facility]  History of Present Illness     8/23/21 she had shortness of breath and chest pain. 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 Dry throat and drinks water and she coughs. Yesterday felt warm and cold. She has tried OTC medication helped her sleep good and chest pain is less.     Rash in her groin, she had pills but they were missing. She took pills from Urgent Care.         Review of Systems   Constitutional: Positive for chills and fever.   HENT:        No anosmia or aguesia   Respiratory: Positive for cough and shortness of breath.    Cardiovascular: Positive for chest pain.   Skin: Positive for rash.         Objective   Vital Signs:   /80   Pulse 81   Ht 167.6 cm (65.98\")   Wt 97.6 kg (215 lb 3.2 oz)   SpO2 98%   BMI 34.75 kg/m²     Physical Exam  Constitutional:       General: She is not in acute distress.     Appearance: She is obese. She is not ill-appearing, toxic-appearing or diaphoretic.   Cardiovascular:      Rate and Rhythm: Normal rate and regular rhythm.   Pulmonary:      Effort: Pulmonary effort is normal.      Breath sounds: Normal breath sounds.   Skin:     Comments: Bilateral groin maroon erythema and inguinal folds        Result Review :                 Assessment and Plan    Diagnoses and all orders for this visit:    1. Exposure to COVID-19 virus (Primary)  -     COVID-19 PCR, [facility], NP SWAB IN [facility]AL TRANSPORT MEDIA/ORAL SWISH 24-30 HR TAT - Swab, Nasopharynx; Future    2. Other chest pain  -     COVID-19 PCR, [facility], NP SWAB IN [facility] V[facility]AL TRANSPORT MEDIA/ORAL SWISH 24-30 HR TAT - Swab, Nasopharynx; Future    3. Tinea cruris  -     clotrimazole " (LOTRIMIN) 1 % external solution; Apply  topically to the appropriate area as directed 2 (Two) Times a Day. To rash  Dispense: 60 mL; Refill: 3    4. Bursitis of left knee, unspecified bursa  -     diclofenac (VOLTAREN) 75 MG EC tablet; Take 1 tablet by mouth 2 (Two) Times a Day As Needed (pain).  Dispense: 60 tablet; Refill: 2        Follow Up   Return in about 1 month (around 9/24/2021) for Office visit, as scheduled.  Patient was given instructions and counseling regarding her condition or for health maintenance advice. Please see specific information pulled into the AVS if appropriate.     Electronically signed by Eguenia Gomes MD, 08/24/21, 4:44 PM EDT.

## 2021-08-25 LAB — SARS-COV-2 RNA NOSE QL NAA+PROBE: DETECTED

## 2021-08-26 ENCOUNTER — TELEPHONE (OUTPATIENT)
Dept: FAMILY MEDICINE CLINIC | Facility: CLINIC | Age: 41
End: 2021-08-26

## 2021-08-26 NOTE — TELEPHONE ENCOUNTER
PATIENT NEEDS A WORK NOTE TO COVER QUARANTINE PERIOD.  FAX [phone]    CALL IF NEEDED [phone]

## 2021-08-26 NOTE — TELEPHONE ENCOUNTER
Eugenia Zuniga MD   8/25/2021 12:52 PM EDT       Positive for COVID-19     Called and spoke with pt. Informed her of Covid test results and will need to quarantine for the next 10 days. Pt verbalized understanding and is needing test results faxed to her job. Pt states she will call the office back with the fax number once she finds it. Pt has no further questions at this time.

## 2021-08-26 NOTE — TELEPHONE ENCOUNTER
Caller: Bernice Camilo    Relationship: Self    Best call back number: [phone]    What is the best time to reach you: ANYTIME    Who are you requesting to speak with (clinical staff, provider,  specific staff member): NA    Do you know the name of the person who called: NA    What was the call regarding: PATIENT REQUESTS CALL BACK REGARDING COVID TEST RESULTS.    Do you require a callback: YES         “ Thank you for sharing this information with me. I will send a message to the . Please allow up to 48 hours for the  to follow up on this request.”

## 2021-09-22 ENCOUNTER — OFFICE VISIT (OUTPATIENT)
Dept: FAMILY MEDICINE CLINIC | Facility: CLINIC | Age: 41
End: 2021-09-22

## 2021-09-22 VITALS
HEIGHT: 66 IN | DIASTOLIC BLOOD PRESSURE: 60 MMHG | SYSTOLIC BLOOD PRESSURE: 140 MMHG | TEMPERATURE: 97.1 F | HEART RATE: 77 BPM | BODY MASS INDEX: 35.03 KG/M2 | WEIGHT: 218 LBS | OXYGEN SATURATION: 99 %

## 2021-09-22 DIAGNOSIS — I10 ESSENTIAL HYPERTENSION: ICD-10-CM

## 2021-09-22 DIAGNOSIS — E11.9 TYPE 2 DIABETES MELLITUS WITHOUT COMPLICATION, WITHOUT LONG-TERM CURRENT USE OF INSULIN (HCC): Primary | ICD-10-CM

## 2021-09-22 DIAGNOSIS — R14.0 ABDOMINAL BLOATING: ICD-10-CM

## 2021-09-22 LAB — HBA1C MFR BLD: 7.2 %

## 2021-09-22 PROCEDURE — 99214 OFFICE O/P EST MOD 30 MIN: CPT | Performed by: FAMILY MEDICINE

## 2021-09-22 PROCEDURE — 83036 HEMOGLOBIN GLYCOSYLATED A1C: CPT | Performed by: FAMILY MEDICINE

## 2021-09-22 RX ORDER — METFORMIN HYDROCHLORIDE 500 MG/1
1000 TABLET, EXTENDED RELEASE ORAL
Qty: 360 TABLET | Refills: 1 | Status: SHIPPED | OUTPATIENT
Start: 2021-09-22 | End: 2022-02-11 | Stop reason: SDUPTHER

## 2021-09-22 RX ORDER — LISINOPRIL 5 MG/1
5 TABLET ORAL DAILY
Qty: 90 TABLET | Refills: 1 | Status: SHIPPED | OUTPATIENT
Start: 2021-09-22 | End: 2022-02-11 | Stop reason: SDUPTHER

## 2021-09-22 RX ORDER — ASPIRIN 81 MG/1
81 TABLET, COATED ORAL DAILY
COMMUNITY
Start: 2021-08-20 | End: 2023-04-05

## 2021-09-22 NOTE — PROGRESS NOTES
"Chief Complaint  Diabetes (1 month follow up, she has not been checking her glucose regularly, but has been taking her medications as prescribed. ) and GI Problem (She has been struggling with gas, she reports that she will eat and feel full the entire day. She would like to discuss further with PCP)    Subjective          Bernice Camilo presents to [facility]  History of Present Illness  Past week she has felt full right after she eats and feels very bloated. She is having gas towards the end of the day. Noticed a little bit of weight gain. Currently eating 2 times a day if she goes to work. Eats one meal per day if she stays home. Eats large meals. Denies stomach pain, constipation, diarrhea, light-headedness, or dizziness. Feels some burning in the throat when she eats. Takes a medication to alleviate this sensation but is not sure of the name. Felt nauseous one time last week but did not vomit.  Feels more tired than normal in the past week. Has been having some insomnia for past week. She can sleep for about an hour and has trouble falling back asleep.     Diet consists of rice. Eats a lot of fish. Does not eat lots of salty or sugary foods. Started exercising two days ago due to concern about weight.    Still taking metformin 1000mg in the evening and is happy with her current dose. She feels she does not have to go to the bathroom as often. Takes Januvia 100mg every day.    Bloating any time she eats and feels too full 30 minutes late. Generalized discomfort. She has a little reflux and taking OTC chews.       Objective   Vital Signs:   /60   Pulse 77   Temp 97.1 °F (36.2 °C)   Ht 167.6 cm (65.98\")   Wt 98.9 kg (218 lb)   SpO2 99%   BMI 35.21 kg/m²     Physical Exam  Vitals reviewed.   Constitutional:       General: She is not in acute distress.     Appearance: She is obese. She is not ill-appearing.   Cardiovascular:      Rate and Rhythm: Normal rate and " regular rhythm.      Heart sounds: Normal heart sounds.   Pulmonary:      Effort: Pulmonary effort is normal.      Breath sounds: Normal breath sounds.   Abdominal:      General: Bowel sounds are normal.      Palpations: Abdomen is soft.      Tenderness: There is no abdominal tenderness.   Neurological:      Mental Status: She is alert.          Result Review :   The following data was reviewed by: Eugenia Gomes MD on 09/22/2021:  Common labs    Common Labsle 4/21/21 6/22/21 6/22/21 9/22/21     1616 1627    WBC 4.77      Hemoglobin 12.3 12.9     Hematocrit 39.3      Platelets 147      Hemoglobin A1C   6.5 7.2                     Assessment and Plan    Diagnoses and all orders for this visit:    1. Type 2 diabetes mellitus without complication, without long-term current use of insulin (CMS/[facility]) (Primary)  -     POC Glycosylated Hemoglobin (Hb A1C)  -     metFORMIN ER (GLUCOPHAGE-XR) 500 MG 24 hr tablet; Take 2 tablets by mouth 2 (Two) Times a Day Before Meals.  Dispense: 360 tablet; Refill: 1  Diabetes uncontrolled likely related to diet.  At this time will increase Metformin up to 2000 mg daily dose and continue Januvia.  Recheck in 3 months.  2. Essential hypertension  -     lisinopril (PRINIVIL,ZESTRIL) 5 MG tablet; Take 1 tablet by mouth Daily.  Dispense: 90 tablet; Refill: 1  At last visit patient wanted to see about reducing medications and stopped her lisinopril under doctor supervision.  Today her blood pressure is high and will reinitiate lisinopril.  Recheck in 3 months.  3. Abdominal bloating  Discussed use of probiotic but patient prefers not to take more pills.  Discussed dietary changes such as reducing high-protein and high fat meals with smaller portions.  Eating plenty of fruits and vegetables.  She has been on Metformin in the past but that is also in the differential as causing side effects.      Follow Up   Return in about 3 months (around 12/22/2021) for Office visit diabetes, HTN  .  Patient was given instructions and counseling regarding her condition or for health maintenance advice. Please see specific information pulled into the AVS if appropriate.     Cookie Becerra, MS3    I saw and evaluated the patient. I discussed the case with the medical student and agree with the findings and plan as documented. I personally performed the Exam and Medical Decision Making. Critical elements of the physical exam and MDM are documented above.    Electronically signed by Eugenia Gomes MD, 09/22/21, 2:51 PM EDT.

## 2021-10-23 DIAGNOSIS — D64.9 POSTOPERATIVE ANEMIA: ICD-10-CM

## 2021-10-25 RX ORDER — FERROUS SULFATE TAB EC 324 MG (65 MG FE EQUIVALENT) 324 (65 FE) MG
TABLET DELAYED RESPONSE ORAL
Qty: 30 TABLET | Refills: 2 | Status: SHIPPED | OUTPATIENT
Start: 2021-10-25 | End: 2022-06-24

## 2021-10-25 NOTE — TELEPHONE ENCOUNTER
Rx Refill Note  Requested Prescriptions     Pending Prescriptions Disp Refills   • ferrous sulfate 324 (65 Fe) MG tablet delayed-release EC tablet [Pharmacy Med Name: FERROUS SULF  MG TABLET] 30 tablet 2     Sig: TAKE 1 TABLET BY MOUTH EVERY DAY WITH BREAKFAST      Last office visit with prescribing clinician: 9/22/2021      Next office visit with prescribing clinician: 1/6/2022            Abby Sparrow MA  10/25/21, 08:17 EDT     Should patient should be on medication?

## 2021-12-15 DIAGNOSIS — M70.52 BURSITIS OF LEFT KNEE, UNSPECIFIED BURSA: ICD-10-CM

## 2021-12-15 RX ORDER — DICLOFENAC SODIUM 75 MG/1
75 TABLET, DELAYED RELEASE ORAL 2 TIMES DAILY PRN
Qty: 60 TABLET | Refills: 2 | Status: SHIPPED | OUTPATIENT
Start: 2021-12-15 | End: 2022-02-11 | Stop reason: SDUPTHER

## 2021-12-15 NOTE — TELEPHONE ENCOUNTER
Rx Refill Note  Requested Prescriptions     Pending Prescriptions Disp Refills   • diclofenac (VOLTAREN) 75 MG EC tablet [Pharmacy Med Name: DICLOFENAC SOD EC 75 MG TAB] 60 tablet 2     Sig: TAKE 1 TABLET BY MOUTH 2 (TWO) TIMES A DAY AS NEEDED (PAIN).      Last office visit with prescribing clinician: 9/22/2021      Next office visit with prescribing clinician: 1/6/2022   3}  Sherrill Hancock MA  12/15/21, 09:56 EST     Last fill: 08/24/2021     NSAIDs Protocol Failed 12/15/2021 12:49 AM   Protocol Details  GFR >45 ml/min in past year    AST less than 55 or ALT less than 90 in past 9 months

## 2021-12-22 DIAGNOSIS — E11.9 TYPE 2 DIABETES MELLITUS WITHOUT COMPLICATION, WITHOUT LONG-TERM CURRENT USE OF INSULIN (HCC): ICD-10-CM

## 2021-12-22 RX ORDER — SITAGLIPTIN 100 MG/1
TABLET, FILM COATED ORAL
Qty: 90 TABLET | Refills: 1 | Status: SHIPPED | OUTPATIENT
Start: 2021-12-22 | End: 2022-02-11 | Stop reason: SDUPTHER

## 2021-12-22 NOTE — TELEPHONE ENCOUNTER
Rx Refill Note  Requested Prescriptions     Pending Prescriptions Disp Refills   • Januvia 100 MG tablet [Pharmacy Med Name: JANUVIA 100 MG TABLET] 90 tablet 1     Sig: TAKE 1 TABLET BY MOUTH EVERY DAY      Last office visit with prescribing clinician: 9/22/2021      Next office visit with prescribing clinician: 1/6/2022   23}  Sherrill Hancock MA  12/22/21, 08:23 EST     Last fill: 06/22/2021

## 2022-01-06 ENCOUNTER — OFFICE VISIT (OUTPATIENT)
Dept: FAMILY MEDICINE CLINIC | Facility: CLINIC | Age: 42
End: 2022-01-06

## 2022-01-06 VITALS
DIASTOLIC BLOOD PRESSURE: 72 MMHG | HEART RATE: 74 BPM | WEIGHT: 212.2 LBS | BODY MASS INDEX: 34.1 KG/M2 | RESPIRATION RATE: 16 BRPM | HEIGHT: 66 IN | SYSTOLIC BLOOD PRESSURE: 122 MMHG | OXYGEN SATURATION: 96 %

## 2022-01-06 DIAGNOSIS — R07.89 CHEST TIGHTNESS: Primary | ICD-10-CM

## 2022-01-06 PROBLEM — D25.1 INTRAMURAL LEIOMYOMA OF UTERUS: Status: RESOLVED | Noted: 2021-03-18 | Resolved: 2022-01-06

## 2022-01-06 PROBLEM — D21.9 FIBROID: Status: RESOLVED | Noted: 2021-02-17 | Resolved: 2022-01-06

## 2022-01-06 PROCEDURE — 93000 ELECTROCARDIOGRAM COMPLETE: CPT | Performed by: FAMILY MEDICINE

## 2022-01-06 PROCEDURE — 99214 OFFICE O/P EST MOD 30 MIN: CPT | Performed by: FAMILY MEDICINE

## 2022-01-06 NOTE — PATIENT INSTRUCTIONS
1.  When you have chest pain, take 1 tablet ibuprofen and wait 1 hour    2.  If you develop shortness of breath, let us know    3.  Have chest x-ray completed before your appointment

## 2022-01-06 NOTE — PROGRESS NOTES
Established Patient Office Visit      Patient Name: Bernice Camilo  : 1980   MRN: [id number]   Care Team: Patient Care Team:  Eugenia Zuniga MD as PCP - General (Family Medicine)  Tuyet Alfonso MD as Consulting Physician (Obstetrics and Gynecology)  Dirk Lilly MD as Consulting Physician (Dermatology)    Chief Complaint:    Chief Complaint   Patient presents with   • Chest Pain       History of Present Illness: Bernice Camilo is a 41 y.o. female who is here today for chief complaint.    HPI  She was scheduled to see her primary care provider today at 12:00 PM, but was about a half hour late for her appointment. At check-in, she stated that she was having chest tightness and tingling, and so was seen for an acute issue.     The patient is a pleasant 41-year-old female with a past medical history significant for modestly controlled type 2 diabetes mellitus, last A1c 7.2%, on Januvia and metformin; hypertension, restarted lisinopril in 2021; hyperlipidemia, on atorvastatin 40 mg daily for primary prevention.    The patient reports that her chest tightness and tingling began last week, but she was waiting for her appointment today to be evaluated. The patient states her symptoms briefly resolved; however, last night her symptoms recurred. She notes she can feel the tightness and tingling in her back as well. She states that the chest tightness and tingling are intermittent and occur for approximately 15 minutes, and then resolve for approximately 2 seconds. She is unsure what triggers the chest tightness. The patient reports that she had a cough at the beginning of 2021. The patient states she is unsure what helps alleviate her symptoms. The chest tightness and tingling are intermittent, sometimes her symptoms last all day, and sometimes they last for 5 minutes. She takes aspirin. She denies taking Advil for the pain.    The following portions of the patient's  history were reviewed and updated as appropriate: allergies, current medications, past family history, past medical history, past social history, past surgical history and problem list.    This patient is accompanied by their self who contributes to the history of their care.    The following portions of the patient's history were reviewed and updated as appropriate: allergies, current medications, past family history, past medical history, past social history, past surgical history and problem list.    Subjective      Review of Systems:   Review of Systems - See HPI    Past Medical History:   Past Medical History:   Diagnosis Date   • Diabetes mellitus (HCC)    • Hyperlipidemia    • Hypertension        Past Surgical History:   Past Surgical History:   Procedure Laterality Date   • HERNIA REPAIR      umbilical   • MYOMECTOMY N/A 3/18/2021    Procedure: ABDOMINAL MYOMECTOMY;  Surgeon: Tuyet Alfonso MD;  Location: [facility];  Service: Obstetrics/Gynecology;  Laterality: N/A;   • UTERINE FIBROID SURGERY  03/18/2021       Family History: History reviewed. No pertinent family history.    Social History:   Social History     Socioeconomic History   • Marital status:    Tobacco Use   • Smoking status: Never Smoker   • Smokeless tobacco: Never Used   Vaping Use   • Vaping Use: Never used   Substance and Sexual Activity   • Alcohol use: Yes     Comment: 1/week    • Drug use: Never   • Sexual activity: Defer       Tobacco History:   Social History     Tobacco Use   Smoking Status Never Smoker   Smokeless Tobacco Never Used       Medications:     Current Outpatient Medications:   •  acetaminophen (Tylenol) 325 MG tablet, Take 2 tablets by mouth Every 4 (Four) Hours As Needed for Mild Pain ., Disp: 100 tablet, Rfl: 2  •  Aspirin Low Dose 81 MG EC tablet, Take 81 mg by mouth Daily., Disp: , Rfl:   •  atorvastatin (LIPITOR) 40 MG tablet, Take 1 tablet by mouth Daily., Disp: 90 tablet, Rfl: 3  •  clotrimazole  "(LOTRIMIN) 1 % external solution, Apply  topically to the appropriate area as directed 2 (Two) Times a Day. To rash, Disp: 60 mL, Rfl: 3  •  diclofenac (VOLTAREN) 75 MG EC tablet, TAKE 1 TABLET BY MOUTH 2 (TWO) TIMES A DAY AS NEEDED (PAIN)., Disp: 60 tablet, Rfl: 2  •  ferrous sulfate 324 (65 Fe) MG tablet delayed-release EC tablet, TAKE 1 TABLET BY MOUTH EVERY DAY WITH BREAKFAST, Disp: 30 tablet, Rfl: 2  •  Januvia 100 MG tablet, TAKE 1 TABLET BY MOUTH EVERY DAY, Disp: 90 tablet, Rfl: 1  •  lisinopril (PRINIVIL,ZESTRIL) 5 MG tablet, Take 1 tablet by mouth Daily., Disp: 90 tablet, Rfl: 1  •  metFORMIN ER (GLUCOPHAGE-XR) 500 MG 24 hr tablet, Take 2 tablets by mouth 2 (Two) Times a Day Before Meals., Disp: 360 tablet, Rfl: 1  •  nystatin (MYCOSTATIN) 567898 UNIT/GM powder, Apply  topically to the appropriate area as directed 4 (Four) Times a Day. For up to 2 weeks, Disp: 60 g, Rfl: 5  •  OneTouch Ultra test strip, CHECK BLOOD SUGARS WHEN FEELING LOW, Disp: , Rfl:     Allergies:   No Known Allergies    Objective   Objective     Physical Exam:  Vital Signs:   Vitals:    01/06/22 1237   BP: 122/72   Pulse: 74   Resp: 16   SpO2: 96%   Weight: 96.3 kg (212 lb 3.2 oz)   Height: 167.6 cm (65.98\")     Body mass index is 34.27 kg/m².     Physical Exam  Nursing note reviewed  Const: NAD, A&Ox4, Pleasant, Cooperative  Eyes: EOMI, no conjunctivitis  ENT: No nasal discharge present, neck supple  Cardiac: Regular rate and rhythm, no cyanosis  Resp: Respiratory rate within normal limits, no increased work of breathing, no audible wheezing or retractions noted  GI: No distention or ascites  MSK: Motor and sensation grossly intact in bilateral upper extremities  Neurologic: CN II-XII grossly intact  Psych: Appropriate mood and behavior.  Skin: Warm, dry  Procedures/Radiology       ECG 12 Lead    Date/Time: 1/6/2022 12:40 PM  Performed by: Kenneth Jackson DO  Authorized by: Kenneth Jackson DO   Comparison: compared with " previous ECG from 3/17/2021  Rhythm: sinus rhythm  Rate: normal  Conduction: conduction normal  ST Segments: ST segments normal  T Waves: T waves normal  QRS axis: normal  Other: no other findings    Clinical impression: normal ECG          No radiology results for the last 7 days     Assessment/Plan   Assessment / Plan      Assessment/Plan:   Problems Addressed This Visit  Diagnoses and all orders for this visit:    1. Chest tightness (Primary)  Comments:  Penetrating through to back  Orders:  -     ECG 12 Lead  -     XR Chest PA & Lateral; Future      Problem List Items Addressed This Visit     None      Visit Diagnoses     Chest tightness    -  Primary    Penetrating through to back    Relevant Orders    ECG 12 Lead    XR Chest PA & Lateral          1. Chest tightness.  Ongoing intermittently for the last week. She does take low-dose aspirin on a daily basis and has a history of diabetes. EKG is reassuring today given the penetrating nature through her back. If the pain continues through next week, I did recommend a chest x-ray before she follows up with Dr. Eugenia Gomes next Friday. She does have a history of gas pain and epigastric pain, and I counseled the patient that this may be related to that as well.    Patient Instructions   1.  When you have chest pain, take 1 tablet ibuprofen and wait 1 hour    2.  If you develop shortness of breath, let us know    3.  Have chest x-ray completed before your appointment      Follow Up:   No follow-ups on file.    DO [address]  [facility]  [address] [phone]  Fax [phone]  Phone [phone]      Transcribed from ambient dictation for Kenneth Jackson DO by Pat Roberts.  01/06/22   17:16 EST    Patient verbalized consent to the visit recording.  I have personally performed the services described in this document as transcribed by the above individual,  and it is both accurate and complete.  Kenneth Jackson DO  1/7/2022  10:12 EST

## 2022-02-11 ENCOUNTER — OFFICE VISIT (OUTPATIENT)
Dept: FAMILY MEDICINE CLINIC | Facility: CLINIC | Age: 42
End: 2022-02-11

## 2022-02-11 VITALS
SYSTOLIC BLOOD PRESSURE: 128 MMHG | DIASTOLIC BLOOD PRESSURE: 88 MMHG | BODY MASS INDEX: 34.55 KG/M2 | WEIGHT: 215 LBS | HEIGHT: 66 IN | HEART RATE: 74 BPM | OXYGEN SATURATION: 99 %

## 2022-02-11 DIAGNOSIS — I10 ESSENTIAL HYPERTENSION: ICD-10-CM

## 2022-02-11 DIAGNOSIS — B35.6 TINEA CRURIS: ICD-10-CM

## 2022-02-11 DIAGNOSIS — N92.6 IRREGULAR BLEEDING: ICD-10-CM

## 2022-02-11 DIAGNOSIS — E11.9 TYPE 2 DIABETES MELLITUS WITHOUT COMPLICATION, WITHOUT LONG-TERM CURRENT USE OF INSULIN: Primary | ICD-10-CM

## 2022-02-11 DIAGNOSIS — E11.9 TYPE 2 DIABETES MELLITUS WITHOUT COMPLICATION, WITHOUT LONG-TERM CURRENT USE OF INSULIN: ICD-10-CM

## 2022-02-11 DIAGNOSIS — M70.52 BURSITIS OF LEFT KNEE, UNSPECIFIED BURSA: ICD-10-CM

## 2022-02-11 LAB
EXPIRATION DATE: NORMAL
HBA1C MFR BLD: 6.5 %
Lab: NORMAL

## 2022-02-11 PROCEDURE — 82043 UR ALBUMIN QUANTITATIVE: CPT | Performed by: FAMILY MEDICINE

## 2022-02-11 PROCEDURE — 83036 HEMOGLOBIN GLYCOSYLATED A1C: CPT | Performed by: FAMILY MEDICINE

## 2022-02-11 PROCEDURE — 3044F HG A1C LEVEL LT 7.0%: CPT | Performed by: FAMILY MEDICINE

## 2022-02-11 PROCEDURE — 99214 OFFICE O/P EST MOD 30 MIN: CPT | Performed by: FAMILY MEDICINE

## 2022-02-11 RX ORDER — CLOTRIMAZOLE 1 G/ML
SOLUTION TOPICAL 2 TIMES DAILY
Qty: 60 ML | Refills: 3 | Status: SHIPPED | OUTPATIENT
Start: 2022-02-11

## 2022-02-11 RX ORDER — DICLOFENAC SODIUM 75 MG/1
75 TABLET, DELAYED RELEASE ORAL 2 TIMES DAILY PRN
Qty: 60 TABLET | Refills: 2 | Status: SHIPPED | OUTPATIENT
Start: 2022-02-11 | End: 2022-05-13 | Stop reason: SDUPTHER

## 2022-02-11 RX ORDER — METFORMIN HYDROCHLORIDE 500 MG/1
1000 TABLET, EXTENDED RELEASE ORAL
Qty: 360 TABLET | Refills: 1 | Status: SHIPPED | OUTPATIENT
Start: 2022-02-11 | End: 2022-08-19 | Stop reason: SDUPTHER

## 2022-02-11 RX ORDER — LISINOPRIL 5 MG/1
5 TABLET ORAL DAILY
Qty: 90 TABLET | Refills: 1 | Status: SHIPPED | OUTPATIENT
Start: 2022-02-11 | End: 2022-08-19 | Stop reason: SDUPTHER

## 2022-02-11 NOTE — PROGRESS NOTES
"Chief Complaint  Hypertension, Diabetes (Urine micro has been collected today. ), and Menstrual Problem (Pt states she has been bleeding since the 28th. Pt states she is on her cycle right now and she is having some lower right pain)    Subjective          Bernice Camilo presents to [facility]  Hypertension  This is a chronic problem. The problem is controlled. Current antihypertension treatment includes ACE inhibitors.   Diabetes  She presents for her follow-up diabetic visit. She has type 2 diabetes mellitus. Current diabetic treatment includes oral agent (dual therapy). An ACE inhibitor/angiotensin II receptor blocker is being taken.   Menstrual Problem  This is a new problem.     Past history is significant for myomectomy March 2021. She has been on period since 1/28. She usually has 3 day menses. Changing pad 2-3 times a day. She would like to become pregnant.     Clotrimazole power didn't work, uses lotion on  region.     Home glucose last night fingerstick was pain. Last one 115.     She has pain in her knees, improves with diclofenac.     Objective   Vital Signs:   /88   Pulse 74   Ht 167.6 cm (65.98\")   Wt 97.5 kg (215 lb)   SpO2 99%   BMI 34.72 kg/m²     Physical Exam  Vitals reviewed.   Constitutional:       General: She is not in acute distress.     Appearance: She is not ill-appearing.   Cardiovascular:      Rate and Rhythm: Normal rate and regular rhythm.   Pulmonary:      Effort: Pulmonary effort is normal.      Breath sounds: Normal breath sounds.   Neurological:      Mental Status: She is alert.   Psychiatric:         Mood and Affect: Mood normal.        Result Review :   The following data was reviewed by: Eugenia Gomes MD on 02/11/2022:  Common labs    Common Labsle 6/22/21 6/22/21 9/22/21 2/11/22    1616 1627     Hemoglobin 12.9      Hemoglobin A1C  6.5 7.2 6.5                     Assessment and Plan    Diagnoses and all orders " for this visit:    1. Type 2 diabetes mellitus without complication, without long-term current use of insulin (HCC) (Primary)  -     POC Glycosylated Hemoglobin (Hb A1C)  -     MicroAlbumin, Urine, Random - Urine, Clean Catch; Future  -     metFORMIN ER (GLUCOPHAGE-XR) 500 MG 24 hr tablet; Take 2 tablets by mouth 2 (Two) Times a Day Before Meals.  Dispense: 360 tablet; Refill: 1  -     SITagliptin (Januvia) 100 MG tablet; Take 1 tablet by mouth Daily.  Dispense: 90 tablet; Refill: 1  Stable. Continue metformin and Januvia. Recheck in 3 months.   2. Tinea cruris  -     clotrimazole (LOTRIMIN) 1 % external solution; Apply  topically to the appropriate area as directed 2 (Two) Times a Day. To rash  Dispense: 60 mL; Refill: 3  Continue lotion when needed.   3. Irregular bleeding  -     Ambulatory Referral to Obstetrics / Gynecology  S/p surgery 1 year ago and desires pregnancy, return to OB/GYN  4. Essential hypertension  -     lisinopril (PRINIVIL,ZESTRIL) 5 MG tablet; Take 1 tablet by mouth Daily.  Dispense: 90 tablet; Refill: 1  Stable. Continue lisinopril.   5. Bursitis of left knee, unspecified bursa  -     diclofenac (VOLTAREN) 75 MG EC tablet; Take 1 tablet by mouth 2 (Two) Times a Day As Needed (pain).  Dispense: 60 tablet; Refill: 2  Continue NSAID.       Follow Up   Return in about 3 months (around 5/11/2022) for Office visit diabetes.  Patient was given instructions and counseling regarding her condition or for health maintenance advice. Please see specific information pulled into the AVS if appropriate.     Electronically signed by Eugenia Gomes MD, 02/11/22, 12:36 PM EST.

## 2022-02-12 LAB — ALBUMIN UR-MCNC: <1.2 MG/DL

## 2022-03-29 PROCEDURE — U0004 COV-19 TEST NON-CDC HGH THRU: HCPCS | Performed by: FAMILY MEDICINE

## 2022-05-13 ENCOUNTER — OFFICE VISIT (OUTPATIENT)
Dept: FAMILY MEDICINE CLINIC | Facility: CLINIC | Age: 42
End: 2022-05-13

## 2022-05-13 VITALS
BODY MASS INDEX: 33.11 KG/M2 | HEIGHT: 66 IN | TEMPERATURE: 97.5 F | OXYGEN SATURATION: 97 % | HEART RATE: 77 BPM | WEIGHT: 206 LBS | RESPIRATION RATE: 18 BRPM | SYSTOLIC BLOOD PRESSURE: 128 MMHG | DIASTOLIC BLOOD PRESSURE: 68 MMHG

## 2022-05-13 DIAGNOSIS — E11.9 TYPE 2 DIABETES MELLITUS WITHOUT COMPLICATION, WITHOUT LONG-TERM CURRENT USE OF INSULIN: Primary | Chronic | ICD-10-CM

## 2022-05-13 DIAGNOSIS — Z01.419 ENCOUNTER FOR GYNECOLOGICAL EXAMINATION WITHOUT ABNORMAL FINDING: ICD-10-CM

## 2022-05-13 DIAGNOSIS — M65.4 DE QUERVAIN'S DISEASE (RADIAL STYLOID TENOSYNOVITIS): ICD-10-CM

## 2022-05-13 DIAGNOSIS — L29.9 ITCHY SKIN: ICD-10-CM

## 2022-05-13 LAB
EXPIRATION DATE: NORMAL
HBA1C MFR BLD: 5.8 %
Lab: NORMAL

## 2022-05-13 PROCEDURE — 83036 HEMOGLOBIN GLYCOSYLATED A1C: CPT | Performed by: FAMILY MEDICINE

## 2022-05-13 PROCEDURE — 99214 OFFICE O/P EST MOD 30 MIN: CPT | Performed by: FAMILY MEDICINE

## 2022-05-13 PROCEDURE — 3044F HG A1C LEVEL LT 7.0%: CPT | Performed by: FAMILY MEDICINE

## 2022-05-13 RX ORDER — DICLOFENAC SODIUM 75 MG/1
75 TABLET, DELAYED RELEASE ORAL 2 TIMES DAILY PRN
Qty: 60 TABLET | Refills: 2 | Status: SHIPPED | OUTPATIENT
Start: 2022-05-13 | End: 2022-10-04

## 2022-05-13 NOTE — PROGRESS NOTES
"Chief Complaint  Wrist Pain (Wrist pain, yesterday she was not able to grab anything with her R hand, her wrist gets swollen at times as well. She is also experiencing some knee pain in both knees. Her private area has been itchy and has a rash. ) and Diabetes    Subjective          Bernice Camilo presents to [facility]  History of Present Illness     No home glucose monitoring. She eats oranges. She has decaf coffee. Pancakes every other days. Oatmeal 3 times a week.     Right wrist and thumb pain. She had weakness with her phone. Pain is a little better since yesterday. She took ibuprofen but she has bruises.     She had pain in her throat after UC visit for 2 weeks, no longer happening. She thought she was allergic to something.   Normal swallowing.     Past 2 weeks rash on her buttock. Itching. No burning. She also has genital itching when she shaves.     She desires to have children.     She is walking too much at work. She has pain under her feet around 2-3pm. She has 12 hours shifts up to 4 days a week.       Objective   Vital Signs:  /68 (BP Location: Right arm, Patient Position: Sitting, Cuff Size: Adult)   Pulse 77   Temp 97.5 °F (36.4 °C) (Infrared)   Resp 18   Ht 167.6 cm (65.98\")   Wt 93.4 kg (206 lb)   SpO2 97%   BMI 33.27 kg/m²     BMI is >= 30 and <= 34.9 (Class 1 obesity). The following options were offered after discussion: nutrition counseling/recommendations      Physical Exam  Vitals reviewed.   Constitutional:       General: She is not in acute distress.     Appearance: She is obese. She is not ill-appearing.   Cardiovascular:      Rate and Rhythm: Normal rate and regular rhythm.   Pulmonary:      Effort: Pulmonary effort is normal.      Breath sounds: Normal breath sounds.   Genitourinary:     Comments: Pubic area without rash.  Musculoskeletal:      Comments: Right wrist positive Finkelstein test.  No swelling or deformity.   Skin:     " Comments: Left upper gluteal with excoriations and no current rash.   Neurological:      Mental Status: She is alert.   Psychiatric:         Mood and Affect: Mood normal.        Result Review :                 Assessment and Plan    Diagnoses and all orders for this visit:    1. Type 2 diabetes mellitus without complication, without long-term current use of insulin (HCC) (Primary)  -     POC Glycosylated Hemoglobin (Hb A1C)  Improving.  Continue metformin and Januvia.  Recheck in 3 months.  2. Itchy skin  No visible rashes on exam.  Discussed use of an aftershave when shaving pubic area.  3. Encounter for gynecological examination without abnormal finding  -     Ambulatory Referral to Obstetrics / Gynecology  Referral placed again for OB/GYN as she desires consultation about having a child.  4. De Quervain's disease (radial styloid tenosynovitis)  -     diclofenac (VOLTAREN) 75 MG EC tablet; Take 1 tablet by mouth 2 (Two) Times a Day As Needed (pain).  Dispense: 60 tablet; Refill: 2  New.  Recommend treating with ice and pain medication as needed.  Recommend also purchasing an over-the-counter wrist splint.           Follow Up   Return in about 3 months (around 8/13/2022) for Office visit diabetes.  Patient was given instructions and counseling regarding her condition or for health maintenance advice. Please see specific information pulled into the AVS if appropriate.     Electronically signed by Eugenia Gomes MD, 05/13/22, 2:51 PM EDT.

## 2022-06-20 NOTE — PROGRESS NOTES
Subjective   Chief Complaint   Patient presents with   • Gynecologic Exam     Well woman gyn exam     Bernice Camilo is a 42 y.o. year old  presenting to be seen for her annual exam. Her last pap was 2021 and normal per patient. She denies history of abnormal pap. She is current on her mammogram. She is interested in trying to conceive. This is made more difficult as her  [place]ives in [place] country. She is planning to visit him soon and hopes to become pregnant on this trip. She reports regular monthly periods. She had a miscarriage last year.She had a myomectomy  with removal of fibroids in 2021 with Dr Alfonso. Her  is 47.   SEXUAL Hx:  She is not currently sexually active.  In the past year there there has been NO new sexual partners.    Condoms are never used.  She would not like to be screened for STD's at today's exam.  Current birth control method: abstinence.  She is happy with her current method of contraception and does not want to discuss alternative methods of contraception.  MENSTRUAL Hx:  Patient's last menstrual period was 2022 (exact date).  In the past 6 months her cycles have been regular, predictable and occur monthly.  Her menstrual flow is typically normal.   Each month on average there are roughly 0 day(s) of very heavy flow.    Intermenstrual bleeding is absent.    Post-coital bleeding is absent.  Dysmenorrhea: mild and is not affecting her activities of daily living  PMS: none and is not affecting her activities of daily living  Her cycles are not a source of concern for her that she wishes to discuss today.  HEALTH Hx:  She exercises regularly: no (and has no plans to become more active).  She wears her seat belt: yes.  She has concerns about domestic violence: no.  OTHER THINGS SHE WANTS TO DISCUSS TODAY:  Nothing else    The following portions of the patient's history were reviewed and updated as appropriate:problem list, current medications,  "allergies, past family history, past medical history, past social history and past surgical history.    Social History    Tobacco Use      Smoking status: Never Smoker      Smokeless tobacco: Never Used    Review of Systems  Constitutional POS: nothing reported    NEG: anorexia or night sweats   Genitourinary POS: nothing reported    NEG: dysuria or hematuria      Gastointestinal POS: nothing reported    NEG: bloating, change in bowel habits, melena or reflux symptoms   Integument POS: nothing reported    NEG: moles that are changing in size, shape, color or rashes   Breast POS: nothing reported    NEG: persistent breast lump, skin dimpling or nipple discharge        Objective   /80 (BP Location: Left arm, Patient Position: Sitting, Cuff Size: Adult)   Ht 165.1 cm (65\")   Wt 95 kg (209 lb 6.4 oz)   LMP 06/02/2022 (Exact Date)   Breastfeeding No   BMI 34.85 kg/m²     General:  well developed; well nourished  no acute distress   Skin:  No suspicious lesions seen   Thyroid: normal to inspection and palpation   Breasts:  Examined in supine position  Symmetric without masses or skin dimpling  Nipples normal without inversion, lesions or discharge  There are no palpable axillary nodes   Abdomen: soft, non-tender; no masses  no umbilical or inguinal hernias are present  no hepato-splenomegaly   Pelvis: Clinical staff was present for exam  :  urethral meatus normal;  Vaginal:  normal pink mucosa without prolapse or lesions.  Cervix:  normal appearance.  Uterus:  normal size, shape and consistency.  Adnexa:  normal bimanual exam of the adnexa.  Rectal:  digital rectal exam not performed; anus visually normal appearing.        Assessment   1. Well woman gynecological exam  2. Conception counseling  3. Uterine fibroids   PLAN  1. Pap was not done today.  I explained to Bernice that the recommendations for Pap smear interval in a low risk patient has lengthened to 3 years time.  I told Bernice she still needs to " be seen in our office yearly for a full physical including breast and pelvic exam.  2. She was encouraged to get yearly mammograms.  She should report any palpable breast lump(s) or skin changes regardless of mammographic findings.  I explained to Bernice that notification regarding her mammogram results will come from the center performing the study.  Our office will not be routinely calling with mammogram results.  It is her responsibility to make sure that the results from the mammogram are communicated to her by the breast center.  If she has any questions about the results, she is welcome to call our office anytime.  3. The importance of keeping all planned follow-up and taking all medications as prescribed was emphasized.  4. Today I discussed with Bernice the total recommended calcium intake for a premenopausal female is 1000 mg.  Ideally this should be from dietary sources.  I reviewed calcium content in various foods including milk, fortified orange juice and yogurt.  If she cannot get sufficient calcium through dietary means, it is recommended to supplement with either a multivitamin or calcium to reach her daily goal.  I also reviewed the difference in the bioavailability of calcium carbonate and calcium citrate containing supplements and the importance of taking calcium carbonate containing products with food.  Finally, vitamin D's role in calcium absorption was reviewed and a total daily vitamin D intake of 800 units was recommended.  5. Follow up for annual exam 1 year  And rtc for tvus for fibroid follow up at patient's availability   6. Discussed period tracking and ovulation predictor kits, discussed if she is having regular monthly periods then high probability she is ovulating. Discussed there are no medications her  would need at this time. Discussed age related fertility concerns.   No orders of the defined types were placed in this encounter.         This note was electronically  signed.    Olena Lopez, APRN  June 24, 2022

## 2022-06-24 ENCOUNTER — OFFICE VISIT (OUTPATIENT)
Dept: OBSTETRICS AND GYNECOLOGY | Facility: CLINIC | Age: 42
End: 2022-06-24

## 2022-06-24 VITALS
BODY MASS INDEX: 34.89 KG/M2 | SYSTOLIC BLOOD PRESSURE: 130 MMHG | WEIGHT: 209.4 LBS | DIASTOLIC BLOOD PRESSURE: 80 MMHG | HEIGHT: 65 IN

## 2022-06-24 DIAGNOSIS — D25.9 UTERINE LEIOMYOMA, UNSPECIFIED LOCATION: Primary | ICD-10-CM

## 2022-06-24 DIAGNOSIS — Z01.419 ENCOUNTER FOR GYNECOLOGICAL EXAMINATION WITHOUT ABNORMAL FINDING: ICD-10-CM

## 2022-06-24 PROCEDURE — 99386 PREV VISIT NEW AGE 40-64: CPT | Performed by: NURSE PRACTITIONER

## 2022-07-01 DIAGNOSIS — N83.201 RIGHT OVARIAN CYST: Primary | ICD-10-CM

## 2022-07-29 ENCOUNTER — OFFICE VISIT (OUTPATIENT)
Dept: FAMILY MEDICINE CLINIC | Facility: CLINIC | Age: 42
End: 2022-07-29

## 2022-07-29 ENCOUNTER — LAB (OUTPATIENT)
Dept: LAB | Facility: HOSPITAL | Age: 42
End: 2022-07-29

## 2022-07-29 ENCOUNTER — HOSPITAL ENCOUNTER (OUTPATIENT)
Dept: GENERAL RADIOLOGY | Facility: HOSPITAL | Age: 42
Discharge: HOME | End: 2022-07-29

## 2022-07-29 VITALS
HEART RATE: 80 BPM | TEMPERATURE: 98.3 F | BODY MASS INDEX: 34.85 KG/M2 | DIASTOLIC BLOOD PRESSURE: 76 MMHG | HEIGHT: 65 IN | SYSTOLIC BLOOD PRESSURE: 116 MMHG | OXYGEN SATURATION: 98 % | WEIGHT: 209.2 LBS

## 2022-07-29 DIAGNOSIS — M25.562 ACUTE PAIN OF LEFT KNEE: ICD-10-CM

## 2022-07-29 DIAGNOSIS — E11.9 TYPE 2 DIABETES MELLITUS WITHOUT COMPLICATION, WITHOUT LONG-TERM CURRENT USE OF INSULIN: Chronic | ICD-10-CM

## 2022-07-29 DIAGNOSIS — L29.9 ITCHING: ICD-10-CM

## 2022-07-29 DIAGNOSIS — M25.562 ACUTE PAIN OF LEFT KNEE: Primary | ICD-10-CM

## 2022-07-29 LAB
ANION GAP SERPL CALCULATED.3IONS-SCNC: 10.1 MMOL/L (ref 5–15)
BUN SERPL-MCNC: 11 MG/DL (ref 6–20)
BUN/CREAT SERPL: 15.1 (ref 7–25)
CALCIUM SPEC-SCNC: 8.5 MG/DL (ref 8.6–10.5)
CHLORIDE SERPL-SCNC: 105 MMOL/L (ref 98–107)
CO2 SERPL-SCNC: 24.9 MMOL/L (ref 22–29)
CREAT SERPL-MCNC: 0.73 MG/DL (ref 0.57–1)
EGFRCR SERPLBLD CKD-EPI 2021: 104.1 ML/MIN/1.73
GLUCOSE SERPL-MCNC: 132 MG/DL (ref 65–99)
POTASSIUM SERPL-SCNC: 3.8 MMOL/L (ref 3.5–5.2)
SODIUM SERPL-SCNC: 140 MMOL/L (ref 136–145)

## 2022-07-29 PROCEDURE — 80048 BASIC METABOLIC PNL TOTAL CA: CPT

## 2022-07-29 PROCEDURE — 73560 X-RAY EXAM OF KNEE 1 OR 2: CPT

## 2022-07-29 PROCEDURE — 99214 OFFICE O/P EST MOD 30 MIN: CPT | Performed by: FAMILY MEDICINE

## 2022-07-29 PROCEDURE — 36415 COLL VENOUS BLD VENIPUNCTURE: CPT

## 2022-07-29 RX ORDER — PREDNISONE 20 MG/1
40 TABLET ORAL DAILY
Qty: 10 TABLET | Refills: 0 | Status: SHIPPED | OUTPATIENT
Start: 2022-07-29 | End: 2022-08-03

## 2022-07-29 RX ORDER — SITAGLIPTIN 100 MG/1
100 TABLET, FILM COATED ORAL DAILY
COMMUNITY
Start: 2022-06-24 | End: 2022-08-19 | Stop reason: SDUPTHER

## 2022-07-29 NOTE — PROGRESS NOTES
"Chief Complaint  Knee Pain (Pt. States her Left Knee has been painful for the last month with swelling), Joint Swelling (Left), and Itching (Pt. States she has noticed itching under her stomach)    Subjective        Bernice Camilo presents to [facility]  Knee Pain   There was no injury mechanism. The pain is present in the left knee. The pain is severe.   Joint Swelling  Associated symptoms include joint swelling.   Itching  Associated symptoms include joint swelling.      As a child one of her knees hit a juanita tree and knee cap twisted. She has been doubling dose of voltaren 2 twice a day for the past month. No ice or heat. She has used ointment from the [facility]. She also has knee swelling.     Pain in right lower abdominal pain now resolved.     Lower abdominal itching w/o rash.     Objective   Vital Signs:  /76 (BP Location: Left arm, Patient Position: Sitting, Cuff Size: Adult)   Pulse 80   Temp 98.3 °F (36.8 °C)   Ht 165.1 cm (65\")   Wt 94.9 kg (209 lb 3.2 oz)   SpO2 98%   BMI 34.81 kg/m²   Estimated body mass index is 34.81 kg/m² as calculated from the following:    Height as of this encounter: 165.1 cm (65\").    Weight as of this encounter: 94.9 kg (209 lb 3.2 oz).          Physical Exam  Constitutional:       General: She is not in acute distress.     Appearance: She is obese.   Pulmonary:      Effort: No respiratory distress.   Abdominal:       Musculoskeletal:      Left knee: Crepitus present. No swelling, deformity, effusion, erythema or ecchymosis. Decreased range of motion ( pain with flexion). Normal patellar mobility.      Instability Tests: Anterior drawer test negative. Anterior Lachman test negative.        Legs:    Neurological:      Gait: Gait normal.   Psychiatric:         Mood and Affect: Mood normal.        Result Review :                Assessment and Plan   Diagnoses and all orders for this visit:    1. Acute pain of left knee " (Primary)  -     Basic metabolic panel; Future  -     XR Knee 1 or 2 View Left; Future  -     predniSONE (DELTASONE) 20 MG tablet; Take 2 tablets by mouth Daily for 5 days.  Dispense: 10 tablet; Refill: 0  New.  Further evaluation with x-rays today.  She has been missed using diclofenac prescription need to check renal function today.  Stop diclofenac.  Prednisone burst.  Discussed side effects of hyperglycemia getting diabetes.  2. Type 2 diabetes mellitus without complication, without long-term current use of insulin ([facility])  Discussed side effects of steroids with hyperglycemia.  Her next diabetes check is scheduled for 3 weeks.  3. Itching  No visible rash.  Discussed with patient she is not on any medications linked to itching.           Follow Up   Return in about 3 weeks (around 8/19/2022) for Office visit, as scheduled.  Patient was given instructions and counseling regarding her condition or for health maintenance advice. Please see specific information pulled into the AVS if appropriate.     Electronically signed by Eugenia Gomes MD, 07/29/22, 9:04 AM EDT.

## 2022-08-01 DIAGNOSIS — M25.562 ACUTE PAIN OF LEFT KNEE: Primary | ICD-10-CM

## 2022-08-19 ENCOUNTER — LAB (OUTPATIENT)
Dept: LAB | Facility: HOSPITAL | Age: 42
End: 2022-08-19

## 2022-08-19 ENCOUNTER — OFFICE VISIT (OUTPATIENT)
Dept: FAMILY MEDICINE CLINIC | Facility: CLINIC | Age: 42
End: 2022-08-19

## 2022-08-19 VITALS
HEIGHT: 65 IN | HEART RATE: 78 BPM | SYSTOLIC BLOOD PRESSURE: 110 MMHG | OXYGEN SATURATION: 99 % | WEIGHT: 206 LBS | BODY MASS INDEX: 34.32 KG/M2 | DIASTOLIC BLOOD PRESSURE: 70 MMHG | TEMPERATURE: 97.1 F

## 2022-08-19 DIAGNOSIS — E11.9 TYPE 2 DIABETES MELLITUS WITHOUT COMPLICATION, WITHOUT LONG-TERM CURRENT USE OF INSULIN: Chronic | ICD-10-CM

## 2022-08-19 DIAGNOSIS — E11.9 TYPE 2 DIABETES MELLITUS WITHOUT COMPLICATION, WITHOUT LONG-TERM CURRENT USE OF INSULIN: Primary | Chronic | ICD-10-CM

## 2022-08-19 DIAGNOSIS — I10 ESSENTIAL HYPERTENSION: Chronic | ICD-10-CM

## 2022-08-19 DIAGNOSIS — N89.8 ITCHING IN THE VAGINAL AREA: ICD-10-CM

## 2022-08-19 LAB — HBA1C MFR BLD: 6.7 % (ref 4.8–5.6)

## 2022-08-19 PROCEDURE — 87798 DETECT AGENT NOS DNA AMP: CPT | Performed by: FAMILY MEDICINE

## 2022-08-19 PROCEDURE — 87661 TRICHOMONAS VAGINALIS AMPLIF: CPT | Performed by: FAMILY MEDICINE

## 2022-08-19 PROCEDURE — 87591 N.GONORRHOEAE DNA AMP PROB: CPT | Performed by: FAMILY MEDICINE

## 2022-08-19 PROCEDURE — 99214 OFFICE O/P EST MOD 30 MIN: CPT | Performed by: FAMILY MEDICINE

## 2022-08-19 PROCEDURE — 83036 HEMOGLOBIN GLYCOSYLATED A1C: CPT

## 2022-08-19 PROCEDURE — 87491 CHLMYD TRACH DNA AMP PROBE: CPT | Performed by: FAMILY MEDICINE

## 2022-08-19 PROCEDURE — 87801 DETECT AGNT MULT DNA AMPLI: CPT | Performed by: FAMILY MEDICINE

## 2022-08-19 RX ORDER — LISINOPRIL 5 MG/1
5 TABLET ORAL DAILY
Qty: 90 TABLET | Refills: 1 | Status: SHIPPED | OUTPATIENT
Start: 2022-08-19 | End: 2023-03-30

## 2022-08-19 RX ORDER — METFORMIN HYDROCHLORIDE 500 MG/1
1000 TABLET, EXTENDED RELEASE ORAL
Qty: 360 TABLET | Refills: 1 | Status: SHIPPED | OUTPATIENT
Start: 2022-08-19 | End: 2023-04-05 | Stop reason: SDUPTHER

## 2022-08-19 RX ORDER — SITAGLIPTIN 100 MG/1
100 TABLET, FILM COATED ORAL DAILY
Qty: 90 TABLET | Refills: 1 | Status: SHIPPED | OUTPATIENT
Start: 2022-08-19 | End: 2023-01-04 | Stop reason: SDDI

## 2022-08-19 RX ORDER — FLUCONAZOLE 150 MG/1
150 TABLET ORAL ONCE
Qty: 2 TABLET | Refills: 0 | Status: SHIPPED | OUTPATIENT
Start: 2022-08-19 | End: 2022-08-19

## 2022-08-19 NOTE — PROGRESS NOTES
"Chief Complaint  Hypertension, Diabetes (3 month follow up), and Rash (She has been using clotrimozole on current rash but has not been effective, has persisting itchiness)    Subjective        Bernice Camilo presents to [facility]  Hypertension  This is a chronic problem. Current antihypertension treatment includes ACE inhibitors.   Diabetes  She presents for her follow-up diabetic visit. She has type 2 diabetes mellitus. Current diabetic treatment includes oral agent (dual therapy).   Rash  This is a recurrent problem.       Vaginal itching for past 3 days. No rash. She has a single bump.     Trying to reduce pop and sugar. She drinks coffee and  Unsweet tea.     She will be traveling to [facility] this fall.      Objective   Vital Signs:  /70   Pulse 78   Temp 97.1 °F (36.2 °C)   Ht 165.1 cm (65\")   Wt 93.4 kg (206 lb)   SpO2 99%   BMI 34.28 kg/m²   Estimated body mass index is 34.28 kg/m² as calculated from the following:    Height as of this encounter: 165.1 cm (65\").    Weight as of this encounter: 93.4 kg (206 lb).          Physical Exam  Vitals reviewed.   Constitutional:       General: She is not in acute distress.     Appearance: She is not ill-appearing.   Cardiovascular:      Rate and Rhythm: Normal rate and regular rhythm.   Pulmonary:      Effort: Pulmonary effort is normal.      Breath sounds: Normal breath sounds.   Genitourinary:     Exam position: Supine.          Comments: Normal mons pubis and labia without rashes  Neurological:      Mental Status: She is alert.        Result Review :  The following data was reviewed by: Eugenia Gomes MD on 08/19/2022:  A1C Last 3 Results    HGBA1C Last 3 Results 9/22/21 2/11/22 5/13/22   Hemoglobin A1C 7.2 6.5 5.8                     Assessment and Plan   Diagnoses and all orders for this visit:    1. Type 2 diabetes mellitus without complication, without long-term current use of insulin (HCC) " (Primary)  -     Cancel: POC Glycosylated Hemoglobin (Hb A1C)  -     Hemoglobin A1c; Future  -     metFORMIN ER (GLUCOPHAGE-XR) 500 MG 24 hr tablet; Take 2 tablets by mouth 2 (Two) Times a Day Before Meals.  Dispense: 360 tablet; Refill: 1  -     Januvia 100 MG tablet; Take 1 tablet by mouth Daily.  Dispense: 90 tablet; Refill: 1  Point-of-care machine with air reading.  Sent patient to the lab for A1c specimen.  Goal A1c 6.5.  Continue Januvia and metformin.  2. Essential hypertension  -     lisinopril (PRINIVIL,ZESTRIL) 5 MG tablet; Take 1 tablet by mouth Daily.  Dispense: 90 tablet; Refill: 1  Blood pressure controlled.  Continue lisinopril.  3. Itching in the vaginal area  -     NuSwab VG+ - Swab, Vagina; Future  -     fluconazole (Diflucan) 150 MG tablet; Take 1 tablet by mouth 1 (One) Time for 1 dose. Repeat in 3 days if needed  Dispense: 2 tablet; Refill: 0  No visible rash.  Patient has risk factors for yeast with diabetes.  I am checking a vaginal swab for infection but will start treatment with Diflucan.  In her right upper thigh there appears to be a healing furuncle.  No need for incision or drainage.  No need for antibiotics at this time.       Follow Up   Return in about 4 months (around 1/2/2023) for Office visit diabetes.  Patient was given instructions and counseling regarding her condition or for health maintenance advice. Please see specific information pulled into the AVS if appropriate.     Electronically signed by Eugenia Gomes MD, 08/19/22, 3:04 PM EDT.

## 2022-08-22 ENCOUNTER — TELEPHONE (OUTPATIENT)
Dept: FAMILY MEDICINE CLINIC | Facility: CLINIC | Age: 42
End: 2022-08-22

## 2022-08-22 LAB
A VAGINAE DNA VAG QL NAA+PROBE: NORMAL SCORE
BVAB2 DNA VAG QL NAA+PROBE: NORMAL SCORE
C ALBICANS DNA VAG QL NAA+PROBE: NEGATIVE
C GLABRATA DNA VAG QL NAA+PROBE: NEGATIVE
C TRACH DNA VAG QL NAA+PROBE: NEGATIVE
MEGA1 DNA VAG QL NAA+PROBE: NORMAL SCORE
N GONORRHOEA DNA VAG QL NAA+PROBE: NEGATIVE
T VAGINALIS DNA VAG QL NAA+PROBE: NEGATIVE

## 2022-08-22 NOTE — TELEPHONE ENCOUNTER
A1c is worsened diabetes uncontrolled.  Continue metformin and Januvia.  Need to add a third medication to improve diabetes.  I have sent a new prescription for Jardiance to the pharmacy.  Jardiance works in your kidneys and has side effects of increased urination.  It can also increase risk for UTIs and yeast infections.  Please schedule an office visit with me for 1 month to follow-up with diabetes and new medication.

## 2022-08-22 NOTE — TELEPHONE ENCOUNTER
Pt informed of lab results for a1c. Explained her nuswab was still pending. Advised her a 3rd medication has been added and that a follow up in 1 month was recommended. Pt scheduled for  9/21/2022 for her next f/u She verbalized understanding and did not have any further questions or concerns.

## 2022-08-27 DIAGNOSIS — E11.9 TYPE 2 DIABETES MELLITUS WITHOUT COMPLICATION, WITHOUT LONG-TERM CURRENT USE OF INSULIN: ICD-10-CM

## 2022-08-29 RX ORDER — ATORVASTATIN CALCIUM 40 MG/1
TABLET, FILM COATED ORAL
Qty: 90 TABLET | Refills: 3 | Status: SHIPPED | OUTPATIENT
Start: 2022-08-29

## 2022-08-29 NOTE — TELEPHONE ENCOUNTER
Rx Refill Note  Requested Prescriptions     Pending Prescriptions Disp Refills   • atorvastatin (LIPITOR) 40 MG tablet [Pharmacy Med Name: ATORVASTATIN 40 MG TABLET] 90 tablet 3     Sig: TAKE 1 TABLET BY MOUTH EVERY DAY      Last office visit with prescribing clinician: 8/19/2022      Next office visit with prescribing clinician: 9/21/2022            Emanuel Beaver MA  08/29/22, 08:54 EDT

## 2022-09-06 ENCOUNTER — PRIOR AUTHORIZATION (OUTPATIENT)
Dept: FAMILY MEDICINE CLINIC | Facility: CLINIC | Age: 42
End: 2022-09-06

## 2022-09-21 ENCOUNTER — OFFICE VISIT (OUTPATIENT)
Dept: FAMILY MEDICINE CLINIC | Facility: CLINIC | Age: 42
End: 2022-09-21

## 2022-09-21 VITALS
HEIGHT: 65 IN | WEIGHT: 207.6 LBS | HEART RATE: 75 BPM | OXYGEN SATURATION: 98 % | SYSTOLIC BLOOD PRESSURE: 112 MMHG | BODY MASS INDEX: 34.59 KG/M2 | DIASTOLIC BLOOD PRESSURE: 74 MMHG

## 2022-09-21 DIAGNOSIS — E11.65 TYPE 2 DIABETES MELLITUS WITH HYPERGLYCEMIA, WITHOUT LONG-TERM CURRENT USE OF INSULIN: Primary | ICD-10-CM

## 2022-09-21 DIAGNOSIS — L29.9 ITCHING: ICD-10-CM

## 2022-09-21 PROCEDURE — 99214 OFFICE O/P EST MOD 30 MIN: CPT | Performed by: FAMILY MEDICINE

## 2022-09-21 RX ORDER — LANCETS 28 GAUGE
EACH MISCELLANEOUS
Qty: 100 EACH | Refills: 12 | Status: SHIPPED | OUTPATIENT
Start: 2022-09-21

## 2022-09-21 RX ORDER — HYDROXYZINE HYDROCHLORIDE 25 MG/1
25 TABLET, FILM COATED ORAL 3 TIMES DAILY PRN
Qty: 60 TABLET | Refills: 3 | Status: SHIPPED | OUTPATIENT
Start: 2022-09-21 | End: 2023-01-04

## 2022-09-21 NOTE — PROGRESS NOTES
"Chief Complaint  Diabetes    Subjective        Bernice Camilo presents to [facility]  Diabetes  She presents for her follow-up diabetic visit. She has type 2 diabetes mellitus.     At her most recent office visit new medication of Jardiance was started in addition to metformin and Januvia. She never filled the Jardiance. Lancet broke and unable to test blood sugars. She say pills are  \"too much.\"     Her body is itching around vagina and into butt. No longer on her belly. She applies alcohol for itching. Clotrimazole is not helping. No rash.     She is leaving to [facility] Nov 20-Dec 31.         Objective   Vital Signs:  /74   Pulse 75   Ht 165.1 cm (65\")   Wt 94.2 kg (207 lb 9.6 oz)   SpO2 98%   BMI 34.55 kg/m²   Estimated body mass index is 34.55 kg/m² as calculated from the following:    Height as of this encounter: 165.1 cm (65\").    Weight as of this encounter: 94.2 kg (207 lb 9.6 oz).          Physical Exam  Constitutional:       General: She is not in acute distress.     Appearance: She is obese. She is not ill-appearing.   Pulmonary:      Effort: No respiratory distress.   Skin:     Findings: No rash.   Psychiatric:         Mood and Affect: Mood normal.        Result Review :  The following data was reviewed by: Eugenia Gomes MD on 09/21/2022:  A1C Last 3 Results    HGBA1C Last 3 Results 2/11/22 5/13/22 8/19/22   Hemoglobin A1C 6.5 5.8 6.70 (A)   (A) Abnormal value                      Assessment and Plan   Diagnoses and all orders for this visit:    1. Type 2 diabetes mellitus with hyperglycemia, without long-term current use of insulin (HCC) (Primary)  -     empagliflozin (Jardiance) 25 MG tablet tablet; Take 1 tablet by mouth Daily.  Dispense: 30 tablet; Refill: 5  -     Lancets (freestyle) lancets; Once a day testing E11.65  Dispense: 100 each; Refill: 12  Discussed with A1c uncontrolled would recommend adding Jardiance.  Patient plans on " making dietary changes.  She is taking the Januvia and metformin.  [place] and plan to recheck her A1c in January when she is available.  Encouraged to check home glucose.  2. Itching  -     hydrOXYzine (ATARAX) 25 MG tablet; Take 1 tablet by mouth 3 (Three) Times a Day As Needed for Itching.  Dispense: 60 tablet; Refill: 3  New.  She was given antifungal medication which did not improve itching.  She has no rash.  Symptomatic management with hydroxyzine.           Follow Up   Return in about 3 months (around 1/2/2023) for Office visit diabetes.  Patient was given instructions and counseling regarding her condition or for health maintenance advice. Please see specific information pulled into the AVS if appropriate.     Electronically signed by Eugenia Gomes MD, 09/21/22, 1:09 PM EDT.

## 2022-09-23 RX ORDER — BLOOD-GLUCOSE METER
KIT MISCELLANEOUS
Qty: 100 EACH | Refills: 3 | Status: SHIPPED | OUTPATIENT
Start: 2022-09-23

## 2022-09-23 NOTE — TELEPHONE ENCOUNTER
Rx Refill Note  Requested Prescriptions     Pending Prescriptions Disp Refills   • FREESTYLE LITE test strip [Pharmacy Med Name: FREESTYLE LITE TEST STRIP]       Sig: CHECK BLOOD SUGARS WHEN FEELING LOW      Last office visit with prescribing clinician: 9/21/2022      Next office visit with prescribing clinician: 1/4/2023            Annmarie Andrade MA  09/23/22, 13:11 EDT

## 2022-10-04 DIAGNOSIS — M65.4 DE QUERVAIN'S DISEASE (RADIAL STYLOID TENOSYNOVITIS): ICD-10-CM

## 2022-10-04 RX ORDER — DICLOFENAC SODIUM 75 MG/1
TABLET, DELAYED RELEASE ORAL
Qty: 60 TABLET | Refills: 2 | Status: SHIPPED | OUTPATIENT
Start: 2022-10-04 | End: 2023-01-30

## 2023-01-04 ENCOUNTER — OFFICE VISIT (OUTPATIENT)
Dept: FAMILY MEDICINE CLINIC | Facility: CLINIC | Age: 43
End: 2023-01-04
Payer: COMMERCIAL

## 2023-01-04 VITALS
HEIGHT: 65 IN | OXYGEN SATURATION: 96 % | SYSTOLIC BLOOD PRESSURE: 110 MMHG | BODY MASS INDEX: 35.75 KG/M2 | DIASTOLIC BLOOD PRESSURE: 80 MMHG | WEIGHT: 214.6 LBS | HEART RATE: 70 BPM

## 2023-01-04 DIAGNOSIS — L29.9 ITCHING: ICD-10-CM

## 2023-01-04 DIAGNOSIS — Z91.199 MEDICALLY NONCOMPLIANT: ICD-10-CM

## 2023-01-04 DIAGNOSIS — E11.65 TYPE 2 DIABETES MELLITUS WITH HYPERGLYCEMIA, WITHOUT LONG-TERM CURRENT USE OF INSULIN: Primary | ICD-10-CM

## 2023-01-04 LAB
EXPIRATION DATE: NORMAL
HBA1C MFR BLD: 7.3 %
Lab: NORMAL

## 2023-01-04 PROCEDURE — 3051F HG A1C>EQUAL 7.0%<8.0%: CPT | Performed by: FAMILY MEDICINE

## 2023-01-04 PROCEDURE — 1159F MED LIST DOCD IN RCRD: CPT | Performed by: FAMILY MEDICINE

## 2023-01-04 PROCEDURE — 1160F RVW MEDS BY RX/DR IN RCRD: CPT | Performed by: FAMILY MEDICINE

## 2023-01-04 PROCEDURE — 99214 OFFICE O/P EST MOD 30 MIN: CPT | Performed by: FAMILY MEDICINE

## 2023-01-04 PROCEDURE — 83036 HEMOGLOBIN GLYCOSYLATED A1C: CPT | Performed by: FAMILY MEDICINE

## 2023-01-04 RX ORDER — NYSTATIN 100000 U/G
1 CREAM TOPICAL 2 TIMES DAILY
Qty: 30 G | Refills: 1 | Status: SHIPPED | OUTPATIENT
Start: 2023-01-04

## 2023-01-04 NOTE — PROGRESS NOTES
Chief Complaint  Diabetes and Itching (From stomach to vignal area/No rash )    Subjective        Bernice Camilo presents to [facility]  Diabetes  She presents for her follow-up diabetic visit. She has type 2 diabetes mellitus. Current diabetic treatment includes oral agent (triple therapy).   Itching      She is only taking metformin. She has too many pills and stopped Jardiance and Januvia. She felt sleepy with hydroxyzine for itch.       Objective   Vital Signs:  /80   Pulse 70   Ht 165.1 cm (65\")   Wt 97.3 kg (214 lb 9.6 oz)   SpO2 96%   BMI 35.71 kg/m²   Estimated body mass index is 35.71 kg/m² as calculated from the following:    Height as of this encounter: 165.1 cm (65\").    Weight as of this encounter: 97.3 kg (214 lb 9.6 oz).    Class 2 Severe Obesity (BMI >=35 and <=39.9). Obesity-related health conditions include the following: hypertension and diabetes mellitus. Obesity is worsening. BMI is is above average; BMI management plan is completed. We discussed low calorie, low carb based diet program.      Physical Exam  Vitals reviewed.   Constitutional:       General: She is not in acute distress.     Appearance: She is obese. She is not ill-appearing.   Cardiovascular:      Rate and Rhythm: Normal rate and regular rhythm.   Pulmonary:      Effort: Pulmonary effort is normal.      Breath sounds: Normal breath sounds.   Abdominal:      General: There is no distension.      Palpations: Abdomen is soft.   Skin:     Findings: No rash.   Neurological:      Mental Status: She is alert.        Result Review :  The following data was reviewed by: Eugenia Jean MD on 01/04/2023:  Common labs    Common Labs 7/29/22 8/19/22 1/4/23   Glucose 132 (A)     BUN 11     Creatinine 0.73     Sodium 140     Potassium 3.8     Chloride 105     Calcium 8.5 (A)     Hemoglobin A1C  6.70 (A) 7.3   (A) Abnormal value            A1C Last 3 Results    HGBA1C Last 3 Results 5/13/22  8/19/22 1/4/23   Hemoglobin A1C 5.8 6.70 (A) 7.3   (A) Abnormal value                      Assessment and Plan   Diagnoses and all orders for this visit:    1. Type 2 diabetes mellitus with hyperglycemia, without long-term current use of insulin (HCC) (Primary)  -     POC Glycosylated Hemoglobin (Hb A1C)    2. Medically noncompliant    3. Itching  -     nystatin (MYCOSTATIN) 318074 UNIT/GM cream; Apply 1 application topically to the appropriate area as directed 2 (Two) Times a Day. For up to 2 weeks for itching  Dispense: 30 g; Refill: 1      Diabetes is worse with uncontrolled A1c.  At this time she only wants to continue metformin.  I strongly urged restarting at least the Januvia prescription to better manage her diabetes.  Discussed long-term complications of hyperglycemia and diabetes untreated including vision changes, kidney function, nerve function, and amputation.  She will need to make dietary changes with less sugar and carbohydrates in her diet along with weight loss.  Recheck A1c again in 3 months.  For itching there is no visible rash.  She did not tolerate the hydroxyzine due to sedation.  At this time try topical nystatin to see if it is possibly fungal related.       Follow Up   Return in about 3 months (around 4/4/2023) for Office visit diabetes.  Patient was given instructions and counseling regarding her condition or for health maintenance advice. Please see specific information pulled into the AVS if appropriate.     Electronically signed by Eugenia Jean MD, 01/04/23, 11:47 AM EST.

## 2023-01-29 DIAGNOSIS — M65.4 DE QUERVAIN'S DISEASE (RADIAL STYLOID TENOSYNOVITIS): ICD-10-CM

## 2023-01-30 RX ORDER — DICLOFENAC SODIUM 75 MG/1
TABLET, DELAYED RELEASE ORAL
Qty: 60 TABLET | Refills: 2 | Status: SHIPPED | OUTPATIENT
Start: 2023-01-30

## 2023-01-30 NOTE — TELEPHONE ENCOUNTER
Rx Refill Note  Requested Prescriptions     Pending Prescriptions Disp Refills   • diclofenac (VOLTAREN) 75 MG EC tablet [Pharmacy Med Name: DICLOFENAC SOD EC 75 MG TAB] 60 tablet 2     Sig: TAKE 1 TABLET BY MOUTH TWICE A DAY AS NEEDED FOR PAIN      Last office visit with prescribing clinician: 1/4/2023   Last telemedicine visit with prescribing clinician: 4/5/2023   Next office visit with prescribing clinician: 4/5/2023                         Would you like a call back once the refill request has been completed: [] Yes [] No    If the office needs to give you a call back, can they leave a voicemail: [] Yes [] No    Abby Sparrow MA  01/30/23, 07:58 EST

## 2023-03-01 ENCOUNTER — OFFICE VISIT (OUTPATIENT)
Dept: FAMILY MEDICINE CLINIC | Facility: CLINIC | Age: 43
End: 2023-03-01
Payer: COMMERCIAL

## 2023-03-01 VITALS
HEIGHT: 65 IN | WEIGHT: 202.6 LBS | BODY MASS INDEX: 33.76 KG/M2 | OXYGEN SATURATION: 98 % | HEART RATE: 77 BPM | SYSTOLIC BLOOD PRESSURE: 118 MMHG | DIASTOLIC BLOOD PRESSURE: 70 MMHG

## 2023-03-01 DIAGNOSIS — Z12.31 VISIT FOR SCREENING MAMMOGRAM: ICD-10-CM

## 2023-03-01 DIAGNOSIS — Z23 NEED FOR VACCINATION: ICD-10-CM

## 2023-03-01 DIAGNOSIS — Z00.00 WELL ADULT EXAM: Primary | ICD-10-CM

## 2023-03-01 DIAGNOSIS — E11.65 TYPE 2 DIABETES MELLITUS WITH HYPERGLYCEMIA, WITHOUT LONG-TERM CURRENT USE OF INSULIN: ICD-10-CM

## 2023-03-01 DIAGNOSIS — D17.22 LIPOMA OF LEFT UPPER EXTREMITY: ICD-10-CM

## 2023-03-01 DIAGNOSIS — Z12.11 SCREENING FOR MALIGNANT NEOPLASM OF COLON: ICD-10-CM

## 2023-03-01 LAB
POC CREATININE URINE: 17.7
POC MICROALBUMIN URINE: 10

## 2023-03-01 PROCEDURE — 90471 IMMUNIZATION ADMIN: CPT | Performed by: FAMILY MEDICINE

## 2023-03-01 PROCEDURE — 99396 PREV VISIT EST AGE 40-64: CPT | Performed by: FAMILY MEDICINE

## 2023-03-01 PROCEDURE — 90677 PCV20 VACCINE IM: CPT | Performed by: FAMILY MEDICINE

## 2023-03-01 PROCEDURE — 82044 UR ALBUMIN SEMIQUANTITATIVE: CPT | Performed by: FAMILY MEDICINE

## 2023-03-01 RX ORDER — LANCETS 28 GAUGE
EACH MISCELLANEOUS
Qty: 1 EACH | Refills: 0 | Status: SHIPPED | OUTPATIENT
Start: 2023-03-01

## 2023-03-01 NOTE — PROGRESS NOTES
"Chief Complaint  Well adult exam (Last Tuesday sugar dropped really low/May need new freestyle kit ) and Annual Exam    Subjective          Bernice Camilo presents to [facility] for   History of Present Illness     Last month she stopped eating rice except for the past 2 days. Not drinking soda. Drinking water. She doesn't drink caffeine. No exercise. She feels tired at work.     Lancet device broke and she gets low battery with     She just started period.     She hasn't had mammogram this year. She felt like breast milk in her bilateral breasts. She wonders if related to menstrual cycle.     She noticed an area under her left upper arm.     Objective   Vital Signs:   Vitals:    03/01/23 1528   BP: 118/70   Pulse: 77   SpO2: 98%   Weight: 91.9 kg (202 lb 9.6 oz)   Height: 165.1 cm (65\")     Body mass index is 33.71 kg/m².    BMI is >= 30 and <35. (Class 1 Obesity). The following options were offered after discussion;: nutrition counseling/recommendations          Physical Exam  Constitutional:       General: She is not in acute distress.     Appearance: She is obese.   HENT:      Right Ear: Tympanic membrane and ear canal normal.      Left Ear: Tympanic membrane and ear canal normal.   Eyes:      General:         Right eye: No discharge.         Left eye: No discharge.      Conjunctiva/sclera: Conjunctivae normal.   Neck:      Thyroid: No thyromegaly.   Cardiovascular:      Rate and Rhythm: Normal rate and regular rhythm.   Pulmonary:      Effort: Pulmonary effort is normal.      Breath sounds: Normal breath sounds.   Abdominal:      Palpations: Abdomen is soft. There is no hepatomegaly.      Tenderness: There is no abdominal tenderness.   Musculoskeletal:      Cervical back: Neck supple.   Lymphadenopathy:      Head:      Right side of head: No submandibular, preauricular or posterior auricular adenopathy.      Left side of head: No submandibular, preauricular or posterior " auricular adenopathy.      Cervical: No cervical adenopathy.   Skin:     General: Skin is warm.   Neurological:      Mental Status: She is alert and oriented to person, place, and time.   Psychiatric:         Mood and Affect: Mood normal.         Behavior: Behavior normal.         Thought Content: Thought content normal.         Judgment: Judgment normal.        Result Review :                Immunization History   Administered Date(s) Administered   • COVID-19 (PFIZER) PURPLE CAP 05/17/2021, 06/08/2021   • Flu Vaccine Split Quad 10/01/2018, 11/20/2019, 01/25/2021   • Pneumococcal Conjugate 20-Valent (PCV20) 03/01/2023   • Pneumococcal Polysaccharide (PPSV23) 12/27/2018   • Tdap 06/24/2019       Health Maintenance   Topic Date Due   • Hepatitis B (1 of 3 - 3-dose series) Never done   • COLORECTAL CANCER SCREENING  Never done   • HEPATITIS C SCREENING  Never done   • ANNUAL PHYSICAL  Never done   • DIABETIC FOOT EXAM  Never done   • PAP SMEAR  Never done   • DIABETIC EYE EXAM  Never done   • LIPID PANEL  Never done   • COVID-19 Vaccine (3 - Booster for Pfizer series) 08/03/2021   • INFLUENZA VACCINE  08/01/2022   • MAMMOGRAM  01/27/2023   • HEMOGLOBIN A1C  04/04/2023   • URINE MICROALBUMIN  03/01/2024   • TDAP/TD VACCINES (2 - Td or Tdap) 06/24/2029   • Pneumococcal Vaccine 0-64  Completed            Assessment and Plan    Diagnoses and all orders for this visit:    1. Well adult exam (Primary)  -     CBC (No Diff); Future  -     Comprehensive Metabolic Panel; Future  -     Lipid Panel; Future  -     TSH; Future  Return when fasting to check labs.  Schedule mammogram through [place] at patient's preference.  Discussed colon cancer screening with Cologuard versus colonoscopy and she elected home Cologuard kit.  Unable to perform Pap smear today as she is currently on menstrual cycle.  2. Type 2 diabetes mellitus with hyperglycemia, without long-term current use of insulin (HCC)  -     Lancets (freestyle) lancets; 1  pen for lancet device  Dispense: 1 each; Refill: 0  -     Comprehensive Metabolic Panel; Future  -     Lipid Panel; Future  -     POC Microalbumin  She needs the lancet quan device and I have sent a refill to the pharmacy.  I also recommend that she take the machine to the pharmacy to see if they can show her how to change the battery.  Continue metformin.  Her next A1c check is due in April.  3. Visit for screening mammogram  -     Mammo Screening Digital Tomosynthesis Bilateral With CAD; Future    4. Screening for malignant neoplasm of colon  -     Cologuard - Stool, Per Rectum; Future    5. Need for vaccination  -     Pneumococcal Conjugate Vaccine 20-Valent All    6. Lipoma of left upper extremity  Benign finding of the skin discussed with patient.      Counseling/anticipatory guidance: Nutrition, immunizations, screenings      Follow Up   Return in about 5 weeks (around 4/5/2023) for Office visit, as scheduled.  Patient was given instructions and counseling regarding her condition or for health maintenance advice. Please see specific information pulled into the AVS if appropriate.      Electronically signed by Eugenia Jean MD, 03/01/23, 4:37 PM EST.

## 2023-03-30 DIAGNOSIS — I10 ESSENTIAL HYPERTENSION: Chronic | ICD-10-CM

## 2023-03-30 DIAGNOSIS — E11.9 TYPE 2 DIABETES MELLITUS WITHOUT COMPLICATION, WITHOUT LONG-TERM CURRENT USE OF INSULIN: Chronic | ICD-10-CM

## 2023-03-30 RX ORDER — SITAGLIPTIN 100 MG/1
TABLET, FILM COATED ORAL
Qty: 90 TABLET | Refills: 1 | Status: SHIPPED | OUTPATIENT
Start: 2023-03-30

## 2023-03-30 RX ORDER — LISINOPRIL 5 MG/1
TABLET ORAL
Qty: 90 TABLET | Refills: 1 | Status: SHIPPED | OUTPATIENT
Start: 2023-03-30

## 2023-03-30 NOTE — TELEPHONE ENCOUNTER
Januvia 100 MG tablet     The original prescription was discontinued on 1/4/2023 by Eugenia Jean MD for the following reason: Non-compliance. Renewing this prescription may not be appropriate.     Rx Refill Note  Requested Prescriptions     Pending Prescriptions Disp Refills   • lisinopril (PRINIVIL,ZESTRIL) 5 MG tablet [Pharmacy Med Name: LISINOPRIL 5 MG TABLET] 90 tablet 1     Sig: TAKE 1 TABLET BY MOUTH EVERY DAY   • Januvia 100 MG tablet [Pharmacy Med Name: JANUVIA 100 MG TABLET] 90 tablet 1     Sig: TAKE 1 TABLET BY MOUTH EVERY DAY      Last office visit with prescribing clinician: 3/1/2023   Last telemedicine visit with prescribing clinician: 4/5/2023   Next office visit with prescribing clinician: 4/5/2023                         Would you like a call back once the refill request has been completed: [] Yes [] No    If the office needs to give you a call back, can they leave a voicemail: [] Yes [] No    Abby Sparrow MA  03/30/23, 07:48 EDT

## 2023-04-05 ENCOUNTER — OFFICE VISIT (OUTPATIENT)
Dept: FAMILY MEDICINE CLINIC | Facility: CLINIC | Age: 43
End: 2023-04-05
Payer: COMMERCIAL

## 2023-04-05 ENCOUNTER — LAB (OUTPATIENT)
Dept: LAB | Facility: HOSPITAL | Age: 43
End: 2023-04-05
Payer: COMMERCIAL

## 2023-04-05 VITALS
HEIGHT: 65 IN | HEART RATE: 64 BPM | SYSTOLIC BLOOD PRESSURE: 122 MMHG | OXYGEN SATURATION: 98 % | BODY MASS INDEX: 32.72 KG/M2 | DIASTOLIC BLOOD PRESSURE: 74 MMHG | WEIGHT: 196.4 LBS

## 2023-04-05 DIAGNOSIS — E11.9 TYPE 2 DIABETES MELLITUS WITHOUT COMPLICATION, WITHOUT LONG-TERM CURRENT USE OF INSULIN: Primary | ICD-10-CM

## 2023-04-05 DIAGNOSIS — E11.65 TYPE 2 DIABETES MELLITUS WITH HYPERGLYCEMIA, WITHOUT LONG-TERM CURRENT USE OF INSULIN: ICD-10-CM

## 2023-04-05 DIAGNOSIS — B35.9 TINEA: ICD-10-CM

## 2023-04-05 DIAGNOSIS — Z12.4 SCREENING FOR CERVICAL CANCER: ICD-10-CM

## 2023-04-05 DIAGNOSIS — Z00.00 WELL ADULT EXAM: ICD-10-CM

## 2023-04-05 LAB
ALBUMIN SERPL-MCNC: 4 G/DL (ref 3.5–5.2)
ALBUMIN/GLOB SERPL: 1.3 G/DL
ALP SERPL-CCNC: 47 U/L (ref 39–117)
ALT SERPL W P-5'-P-CCNC: 12 U/L (ref 1–33)
ANION GAP SERPL CALCULATED.3IONS-SCNC: 10 MMOL/L (ref 5–15)
AST SERPL-CCNC: 19 U/L (ref 1–32)
BILIRUB SERPL-MCNC: 0.6 MG/DL (ref 0–1.2)
BUN SERPL-MCNC: 8 MG/DL (ref 6–20)
BUN/CREAT SERPL: 10.7 (ref 7–25)
CALCIUM SPEC-SCNC: 8.8 MG/DL (ref 8.6–10.5)
CHLORIDE SERPL-SCNC: 104 MMOL/L (ref 98–107)
CHOLEST SERPL-MCNC: 137 MG/DL (ref 0–200)
CO2 SERPL-SCNC: 24 MMOL/L (ref 22–29)
CREAT SERPL-MCNC: 0.75 MG/DL (ref 0.57–1)
DEPRECATED RDW RBC AUTO: 42.5 FL (ref 37–54)
EGFRCR SERPLBLD CKD-EPI 2021: 100.2 ML/MIN/1.73
ERYTHROCYTE [DISTWIDTH] IN BLOOD BY AUTOMATED COUNT: 15 % (ref 12.3–15.4)
EXPIRATION DATE: NORMAL
GLOBULIN UR ELPH-MCNC: 3.2 GM/DL
GLUCOSE SERPL-MCNC: 98 MG/DL (ref 65–99)
HBA1C MFR BLD: 5.9 %
HCT VFR BLD AUTO: 37.4 % (ref 34–46.6)
HDLC SERPL-MCNC: 38 MG/DL (ref 40–60)
HGB BLD-MCNC: 12.2 G/DL (ref 12–15.9)
LDLC SERPL CALC-MCNC: 80 MG/DL (ref 0–100)
LDLC/HDLC SERPL: 2.08 {RATIO}
Lab: NORMAL
MCH RBC QN AUTO: 25.4 PG (ref 26.6–33)
MCHC RBC AUTO-ENTMCNC: 32.6 G/DL (ref 31.5–35.7)
MCV RBC AUTO: 77.9 FL (ref 79–97)
PLATELET # BLD AUTO: 152 10*3/MM3 (ref 140–450)
PMV BLD AUTO: 10.7 FL (ref 6–12)
POTASSIUM SERPL-SCNC: 3.9 MMOL/L (ref 3.5–5.2)
PROT SERPL-MCNC: 7.2 G/DL (ref 6–8.5)
RBC # BLD AUTO: 4.8 10*6/MM3 (ref 3.77–5.28)
SODIUM SERPL-SCNC: 138 MMOL/L (ref 136–145)
TRIGL SERPL-MCNC: 99 MG/DL (ref 0–150)
TSH SERPL DL<=0.05 MIU/L-ACNC: 0.86 UIU/ML (ref 0.27–4.2)
VLDLC SERPL-MCNC: 19 MG/DL (ref 5–40)
WBC NRBC COR # BLD: 4.91 10*3/MM3 (ref 3.4–10.8)

## 2023-04-05 PROCEDURE — 80061 LIPID PANEL: CPT

## 2023-04-05 PROCEDURE — 80050 GENERAL HEALTH PANEL: CPT

## 2023-04-05 RX ORDER — PSEUDOEPHED/ACETAMINOPH/DIPHEN 30MG-500MG
TABLET ORAL AS NEEDED
COMMUNITY
Start: 2023-04-03

## 2023-04-05 RX ORDER — AMOXICILLIN 875 MG/1
TABLET, COATED ORAL TAKE AS DIRECTED
COMMUNITY
Start: 2023-04-03

## 2023-04-05 RX ORDER — METFORMIN HYDROCHLORIDE 500 MG/1
1000 TABLET, EXTENDED RELEASE ORAL
Qty: 360 TABLET | Refills: 1 | Status: SHIPPED | OUTPATIENT
Start: 2023-04-05

## 2023-04-05 NOTE — PROGRESS NOTES
"Chief Complaint  Diabetes    Subjective        Bernice Camilo presents to [facility]  Diabetes  She presents for her follow-up diabetic visit. She has type 2 diabetes mellitus. Current diabetic treatment includes oral agent (dual therapy).     She is cutting back on how much she is eating.     She has been taking baby aspirin.     She has genital itching. No vaginal discharge. She applies vaseline. She has left lower abdominal pain. She used lotrimin and it went away but came back.     She had tooth extracted in the past week.         Objective   Vital Signs:  /74   Pulse 64   Ht 165.1 cm (65\")   Wt 89.1 kg (196 lb 6.4 oz)   SpO2 98%   BMI 32.68 kg/m²   Estimated body mass index is 32.68 kg/m² as calculated from the following:    Height as of this encounter: 165.1 cm (65\").    Weight as of this encounter: 89.1 kg (196 lb 6.4 oz).             Physical Exam  Vitals reviewed. Exam conducted with a chaperone present.   Constitutional:       General: She is not in acute distress.     Appearance: She is obese. She is not ill-appearing.   Cardiovascular:      Rate and Rhythm: Normal rate and regular rhythm.   Pulmonary:      Effort: Pulmonary effort is normal.      Breath sounds: Normal breath sounds.   Abdominal:       Genitourinary:     General: Normal vulva.      Exam position: Lithotomy position.      Labia:         Right: No rash or lesion.         Left: No rash or lesion.       Urethra: No urethral lesion.      Vagina: No vaginal discharge, erythema, bleeding or lesions.      Cervix: Normal.      Uterus: Normal.       Adnexa: Right adnexa normal and left adnexa normal.   Neurological:      Mental Status: She is alert.        Result Review :  The following data was reviewed by: Eugenia Jean MD on 04/05/2023:  A1C Last 3 Results    HGBA1C Last 3 Results 8/19/22 1/4/23 4/5/23   Hemoglobin A1C 6.70 (A) 7.3 5.9   (A) Abnormal value                       "   Assessment and Plan   Diagnoses and all orders for this visit:    1. Type 2 diabetes mellitus without complication, without long-term current use of insulin (Primary)  -     POC Glycosylated Hemoglobin (Hb A1C)  -     metFORMIN ER (GLUCOPHAGE-XR) 500 MG 24 hr tablet; Take 2 tablets by mouth 2 (Two) Times a Day Before Meals.  Dispense: 360 tablet; Refill: 1  Improved.  Continue metformin and Januvia.  Continue dietary changes.  Recheck in 3 months.  2. Screening for cervical cancer  -     LIQUID-BASED PAP SMEAR WITH HPV GENOTYPING REGARDLESS OF INTERPRETATION ([facility]); Future  -     LIQUID-BASED PAP SMEAR WITH HPV GENOTYPING REGARDLESS OF INTERPRETATION ([facility])  At time of her physical she was on her.  Therefore cervical cancer screening was performed today.  3. Tinea  No signs of vulvar lesions or vaginal involvement.  She has recurrent fungal infections and advised her to use Lotrimin again.           Follow Up   Return in about 3 months (around 7/5/2023) for Office visit diabetes.  Patient was given instructions and counseling regarding her condition or for health maintenance advice. Please see specific information pulled into the AVS if appropriate.     Electronically signed by Eugenia Jean MD, 04/05/23, 11:59 AM EDT.

## 2023-04-11 ENCOUNTER — TELEPHONE (OUTPATIENT)
Dept: FAMILY MEDICINE CLINIC | Facility: CLINIC | Age: 43
End: 2023-04-11
Payer: COMMERCIAL

## 2023-04-11 DIAGNOSIS — R87.810 ATYPICAL SQUAMOUS CELL CHANGES OF UNDETERMINED SIGNIFICANCE (ASCUS) ON CERVICAL CYTOLOGY WITH POSITIVE HIGH RISK HUMAN PAPILLOMA VIRUS (HPV): ICD-10-CM

## 2023-04-11 DIAGNOSIS — B97.7 HIGH RISK HPV INFECTION: Primary | ICD-10-CM

## 2023-04-11 DIAGNOSIS — R87.610 ATYPICAL SQUAMOUS CELL CHANGES OF UNDETERMINED SIGNIFICANCE (ASCUS) ON CERVICAL CYTOLOGY WITH POSITIVE HIGH RISK HUMAN PAPILLOMA VIRUS (HPV): ICD-10-CM

## 2023-04-11 LAB — REF LAB TEST METHOD: NORMAL

## 2023-04-11 NOTE — TELEPHONE ENCOUNTER
Attempted to contact, no answer left message asking for return call    Pap smear is abnormal and there is HPV infection.  I am referring to OB/GYN for evaluation.  Hub can relay and document.

## 2023-04-12 RX ORDER — TERBINAFINE HYDROCHLORIDE 250 MG/1
250 TABLET ORAL DAILY
Qty: 14 TABLET | Refills: 0 | Status: SHIPPED | OUTPATIENT
Start: 2023-04-12 | End: 2023-04-26

## 2023-04-12 NOTE — TELEPHONE ENCOUNTER
Contacted patient to notify, she requested copy of result mailed to home address    She reports ongoing tinea, and mentioned itching and irritation. I advised her to use to Lotrimin,     She is asking for an oral pill to take to help with this issue?

## 2023-04-29 DIAGNOSIS — M65.4 DE QUERVAIN'S DISEASE (RADIAL STYLOID TENOSYNOVITIS): ICD-10-CM

## 2023-05-01 RX ORDER — DICLOFENAC SODIUM 75 MG/1
TABLET, DELAYED RELEASE ORAL
Qty: 60 TABLET | Refills: 2 | Status: SHIPPED | OUTPATIENT
Start: 2023-05-01

## 2023-05-01 NOTE — TELEPHONE ENCOUNTER
Rx Refill Note  Requested Prescriptions     Pending Prescriptions Disp Refills   • diclofenac (VOLTAREN) 75 MG EC tablet [Pharmacy Med Name: DICLOFENAC SOD EC 75 MG TAB] 60 tablet 2     Sig: TAKE 1 TABLET BY MOUTH TWICE A DAY AS NEEDED FOR PAIN      Last office visit with prescribing clinician: 4/5/2023   Last telemedicine visit with prescribing clinician: 7/10/2023   Next office visit with prescribing clinician: 7/10/2023                         Would you like a call back once the refill request has been completed: [] Yes [] No    If the office needs to give you a call back, can they leave a voicemail: [] Yes [] No    Abby Sparrow MA  05/01/23, 09:11 EDT

## 2023-05-08 DIAGNOSIS — R21 RASH: Primary | ICD-10-CM

## 2023-05-09 RX ORDER — TERBINAFINE HYDROCHLORIDE 250 MG/1
TABLET ORAL
Qty: 14 TABLET | Refills: 0 | OUTPATIENT
Start: 2023-05-09

## 2023-05-09 NOTE — TELEPHONE ENCOUNTER
Rx Refill Note  Requested Prescriptions     Pending Prescriptions Disp Refills   • terbinafine (lamiSIL) 250 MG tablet [Pharmacy Med Name: TERBINAFINE  MG TABLET] 14 tablet 0     Sig: TAKE 1 TABLET BY MOUTH EVERY DAY FOR 2 WEEKS      Last office visit with prescribing clinician: 4/5/2023   Last telemedicine visit with prescribing clinician:  Next office visit with prescribing clinician: 7/10/2023                         Would you like a call back once the refill request has been completed: [] Yes [] No    If the office needs to give you a call back, can they leave a voicemail: [] Yes [] No    Jacqueline Cooper MA  05/09/23, 09:34 EDT

## 2023-05-09 NOTE — TELEPHONE ENCOUNTER
Declined refill for terbinafine pill.  I am placing a referral to dermatology to further evaluate the rash since symptoms are ongoing and not responding to treatment.

## 2023-05-11 NOTE — TELEPHONE ENCOUNTER
Eugenia Jean MD 2 days ago       Declined refill for terbinafine pill.  I am placing a referral to dermatology to further evaluate the rash since symptoms are ongoing and not responding to treatment.        Called and spoke with patient and informed of message. Patient verbalized understanding no further questions

## 2023-05-30 ENCOUNTER — TELEPHONE (OUTPATIENT)
Dept: FAMILY MEDICINE CLINIC | Facility: CLINIC | Age: 43
End: 2023-05-30

## 2023-05-30 NOTE — TELEPHONE ENCOUNTER
Caller: Bernice Camilo    Relationship to patient: Self    Best call back number: [phone]    Chief complaint: CHEST PAINS AND PRESSURE    Patient directed to call 911 or go to their nearest emergency room.     Patient verbalized understanding: [x] Yes  [] No  If no, why?    Additional notes: PATIENT SAID SHE IS GOING TO TRY TO GO TO THE ER. PATIENT SAID IT IS CURRENT AND HAS BEEN ONGOING FOR 3 DAYS.

## 2023-05-31 NOTE — TELEPHONE ENCOUNTER
Patient was evaluated at [facility] yesterday, she reports she is improving and does not require follow up

## 2023-05-31 NOTE — TELEPHONE ENCOUNTER
I, Niki Taylor, attest that I performed the duties of a scribe for this entire encounter in the presence of Dr. Georges Salinas and the patient.   HISTORY OF PRESENT ILLNESS  Tammy is a 43 year old female, here for her annual preventative physical exam.  Her blood pressure is 124/82. Her weight is 217 with a BMI of 32.1. She has gained weight and attributes this to increased stress/anxiety. She is a stress eater. She has increased bilateral lower extremity edema likely secondary to the weight gain. The patient complains of chest tightness for the last 1 week. She uses Albuterol 2 puffs every 4 hours as needed for asthma. I suggested that she try using the Albuterol and see if the chest tightness improves. She is on Trazodone 100 mg 2 tablets at bedtime for anxiety/depression. Her anxiety it \"really bad\". The patient was advised to increase Trazodone 300 mg at bedtime and add Buspar 15 mg 3 times a day as needed for anxiety. Her gastroesophageal reflux disease is stable on Protonix 40 mg a day. She has terrible abdominal cramping with lactose but does not try to avoid it.  Her chronic lower back pain/fibromyalgia is controlled on Diclofenac 75 mg twice a day, Tramadol 50 mg 4 times a day and Zanaflex 2 mg 3 times a day as needed. A urine drug screen and drug contract were completed today. She takes Allegra 180 mg a day and uses Flonase nasal spray 2 sprays daily as needed for rhinitis but hasn't needed them recently. Her hypothyroidism is being treated with Synthroid 200 mcg a day. Her migraine headaches have been stable. She uses Imitrex 100 mg as needed. Her total cholesterol is 201. LDL is 120. HDL is 23. Triglycerides are 291. Aerobic conditioning was recommended. Her fasting blood glucose is 105. Pre-diabetes mellitus was discussed and weight loss was recommended. We will continue to monitor. All other lab results were reviewed and are within normal limits. The patient is tolerating all of her  Please call patient.  I do not see any emergency department notes.  If she is having chest pain I recommend emergency department evaluation.   medication well.     PDMP reviewed; therapy appropriate, no aberrant behavior identified, prescription given.      MEDICATIONS  Current Outpatient Prescriptions   Medication Sig   • traZODone (DESYREL) 300 MG tablet Take 1 tablet by mouth nightly.   • traMADol (ULTRAM) 50 MG tablet TAKE 1 TABLET BY MOUTH 4 TIMES DAILY   • diclofenac (VOLTAREN) 75 MG EC tablet TAKE 1 TABLET BY MOUTH TWICE DAILY   • tiZANidine (ZANAFLEX) 2 MG tablet TAKE 1 TABLET BY MOUTH EVERY 8 HOURS FOR MUSCLE SPASMS   • pantoprazole (PROTONIX) 40 MG tablet Take 1 tablet by mouth daily.   • levothyroxine (SYNTHROID, LEVOTHROID) 200 MCG tablet TAKE 1 TABLET BY MOUTH DAILY   • busPIRone (BUSPAR) 15 MG tablet Take 1 tablet by mouth 3 times daily as needed (Anxiety).   • amoxicillin-clavulanate (AUGMENTIN) 875-125 MG per tablet Take 1 tablet by mouth every 12 hours.   • fluticasone (FLONASE) 50 MCG/ACT nasal spray SPRAY ONCE IN EACH NOSTRIL DAILY   • sumatriptan (IMITREX) 100 MG tablet Take 1 tablet by mouth as needed for Migraine. May repeat in 2hr if needed   • albuterol (PROAIR HFA) 108 (90 BASE) MCG/ACT inhaler Inhale 2 puffs into the lungs every 4 hours as needed for Shortness of Breath or Wheezing.   • Spacer/Aero Chamber Mouthpiece Misc Use with all HFA inhalers   • fexofenadine (ALLEGRA) 180 MG tablet Take 180 mg by mouth daily.     No current facility-administered medications for this visit.      ALLERGIES:  Percocet [oxycodone-acetaminophen]; Risperdal; Adhesive; Codeine; Tegaderm [gelpad dressing]; Latex; Topamax; and Tramadol    HISTORY  Past Medical History:   Diagnosis Date   • Anxiety    • Asthma    • Chronic back pain    • Fibromyalgia    • GERD (gastroesophageal reflux disease)    • HTN (hypertension)    • Hyperlipidemia    • Hypothyroidism     goiter   • Inflammatory bowel disease    • Migraines    • Rhinitis      Past Surgical History:   Procedure Laterality Date   • APPENDECTOMY     • BLADDER SUSPENSION  2008   • COLONOSCOPY   11/18/2011   • ESOPHAGOGASTRODUODENOSCOPY TRANSORAL FLEX W/BX SINGLE OR MULT  11/18/2011    EGD with Bx   • ESOPHAGOGASTRODUODENOSCOPY TRANSORAL FLEX W/BX SINGLE OR MULT  10/22/2004    EGD with Bx   • HYSTERECTOMY  12/14/2007    Unilateral salpingo-oophorectomy   • SHOULDER ARTHROSCOPY  05/01/2007     Family History   Problem Relation Age of Onset   • Diabetes Mother    • Hypertension Mother    • High cholesterol Mother    • Diabetes Father    • Kidney disease Father    • Heart disease Father    • Hypertension Father    • High cholesterol Father    • Early death Father      History   Smoking Status   • Never Smoker   Smokeless Tobacco   • Never Used       FEMALE HISTORY    The patient has not been asked about pregnancy..  No LMP recorded. Patient has had a hysterectomy.      REVIEW OF SYSTEMS  Pertinent positives in History of Present Illness.  All other review of systems reviewed and negative.      PHYSICAL EXAMINATION  VITAL SIGNS:    Visit Vitals  /82   Pulse 84   Resp 16   Ht 5' 9\" (1.753 m)   Wt 98.4 kg   BMI 32.05 kg/m²   .  GENERAL:  Well-developed female who appears stated age.  She is alert and oriented x3, and in no acute distress.     SKIN:  Dry.  No rashes.   LYMPH NODES:  No cervical or supraclavicular adenopathy.   HEENT:  Head normocephalic.  Ears are normal bilaterally.  Tympanic membranes are intact and external auditory canals normal.  Extraocular movements intact.  Eyes - Pupils equal, round, reactive to light and accommodation.  Conjunctivae are clear.  Posterior pharynx without erythema or exudate.  NECK:  Supple, no palpable abnormalities.  Thyroid midline and symmetric.  Carotid upstrokes equal bilaterally, no bruit.   LUNGS:  Clear to auscultation.  Breath sounds equal bilaterally.   HEART:  S1 and S2 regular.  No murmur.   ABDOMEN:  Soft and nontender.  No masses.  No hepatosplenomegaly.    BREASTS:  No palpable abnormalities.  No nipple discharge.     EXTREMITIES:  No edema, cyanosis  or clubbing.    LABS  Lab Services on 06/24/2017   Component Date Value Ref Range Status   • Fasting Status 06/24/2017 10  hrs Final   • Sodium 06/24/2017 140  135 - 145 mmol/L Final   • Potassium 06/24/2017 3.9  3.4 - 5.1 mmol/L Final   • Chloride 06/24/2017 104  98 - 107 mmol/L Final   • Carbon Dioxide 06/24/2017 26  21 - 32 mmol/L Final   • Anion Gap 06/24/2017 14  10 - 20 mmol/L Final   • Glucose 06/24/2017 105* 65 - 99 mg/dL Final   • BUN 06/24/2017 14  6 - 20 mg/dL Final   • Creatinine 06/24/2017 0.94  0.51 - 0.95 mg/dL Final   • GFR Estimate,  06/24/2017 85   Final   • GFR Estimate, Non  06/24/2017 73   Final   • BUN/Creatinine Ratio 06/24/2017 15  7 - 25 Final   • CALCIUM 06/24/2017 8.9  8.4 - 10.2 mg/dL Final   • TSH 06/24/2017 0.899  0.350 - 5.000 mcUnits/mL Final   • FASTING STATUS 06/24/2017 10  hrs Final   • CHOLESTEROL 06/24/2017 201* <200 mg/dL Final   • CALCULATED LDL 06/24/2017 120  <130 mg/dL Final   • HDL 06/24/2017 23* >59 mg/dL Final   • TRIGLYCERIDE 06/24/2017 291* <150 mg/dL Final   • CALCULATED NON HDL 06/24/2017 178  mg/dL Final   • CHOL/HDL 06/24/2017 8.7* <4.5 Final   • Albumin 06/24/2017 3.6  3.6 - 5.1 g/dL Final   • TOTAL BILIRUBIN 06/24/2017 0.5  0.2 - 1.0 mg/dL Final   • DIRECT BILIRUBIN 06/24/2017 0.1  0.0 - 0.2 mg/dL Final   • ALK PHOSPHATASE 06/24/2017 78  45 - 117 Units/L Final   • ALT/SGPT 06/24/2017 39  <79 Units/L Final   • AST/SGOT 06/24/2017 20  <38 Units/L Final   • TOTAL PROTEIN 06/24/2017 6.1* 6.4 - 8.2 g/dL Final   • COLOR 06/24/2017 YELLOW  YELLOW Final   • APPEARANCE 06/24/2017 HAZY   Final   • GLUCOSE(URINE) 06/24/2017 NEGATIVE  NEGATIVE mg/dL Final   • BILIRUBIN 06/24/2017 NEGATIVE  NEGATIVE Final   • KETONES 06/24/2017 TRACE* NEGATIVE mg/dL Final   • SPECIFIC GRAVITY 06/24/2017 >1.030* 1.005 - 1.030 Final   • BLOOD 06/24/2017 NEGATIVE  NEGATIVE Final   • pH 06/24/2017 5.5  5.0 - 7.0 Units Final   • PROTEIN(URINE) 06/24/2017 TRACE*  NEGATIVE mg/dL Final   • UROBILINOGEN 06/24/2017 0.2  0.0 - 1.0 mg/dL Final   • NITRITE 06/24/2017 NEGATIVE  NEGATIVE Final   • LEUKOCYTE ESTERASE 06/24/2017 NEGATIVE  NEGATIVE Final   • SPECIMEN TYPE 06/24/2017 URINE, CLEAN CATCH/MIDSTREAM   Final   • WBC 06/24/2017 5.4  4.2 - 11.0 K/mcL Final   • RBC 06/24/2017 4.45  4.00 - 5.20 mil/mcL Final   • HGB 06/24/2017 12.8  12.0 - 15.5 g/dL Final   • HCT 06/24/2017 37.2  36.0 - 46.5 % Final   • MCV 06/24/2017 83.6  78.0 - 100.0 fl Final   • MCH 06/24/2017 28.8  26.0 - 34.0 pg Final   • MCHC 06/24/2017 34.4  32.0 - 36.5 g/dL Final   • RDW-CV 06/24/2017 13.3  11.0 - 15.0 % Final   • PLT 06/24/2017 234  140 - 450 K/mcL Final   • DIFF TYPE 06/24/2017 AUTOMATED DIFFERENTIAL   Final   • Neutrophil 06/24/2017 58  % Final   • LYMPH 06/24/2017 28  % Final   • MONO 06/24/2017 9  % Final   • EOSIN 06/24/2017 4  % Final   • BASO 06/24/2017 1  % Final   • Absolute Neutrophil 06/24/2017 3.2  1.8 - 7.7 K/mcL Final   • Absolute Lymph 06/24/2017 1.5  1.0 - 4.8 K/mcL Final   • Absolute Mono 06/24/2017 0.5  0.3 - 0.9 K/mcL Final   • Absolute Eos 06/24/2017 0.2  0.1 - 0.5 K/mcL Final   • Absolute Baso 06/24/2017 0.0  0.0 - 0.3 K/mcL Final            IMPRESSION  Annual preventative physical examination  Obesity  Anxiety/stress, increased  Degenerative joint disease/lower back pain/fibromyalgia  Asthma, stable  Rhinitis, well controlled  Hypothyroidism, well controlled  Gastroesophageal reflux disease, stable  Migraine headaches, stable  Hyperlipidemia  Pre-diabetes mellitus    PLAN  Continue the same medications.  Routine screening Mammogram.  Diet, exercise and weight loss.  Increase Trazodone 300 mg at bedtime for anxiety/depression.  Try Buspar 15 mg 3 times a day as needed for anxiety.  Urine drug screen and drug contract completed today.  Patient should return to clinic in 1 year, or sooner as needed for CPE with pre-clinic basic metabolic panel, lipid panel, liver panel, CBC, UA and  TSH.    This is Niki Taylor, acting as a scribe for Georges Salinas MD. The above note represents Georges Salinas MD evaluation of this patient.    Niki Taylor, Medical Scribe    I, Georges Salinas MD, attest that I performed all of the work during this encounter and that the scribe only recorded my findings .    Georges Salinas MD

## 2023-07-04 PROBLEM — R87.610 ASCUS WITH POSITIVE HIGH RISK HPV CERVICAL: Status: ACTIVE | Noted: 2023-07-04

## 2023-07-04 PROBLEM — Z01.419 WELL WOMAN EXAM: Status: ACTIVE | Noted: 2023-07-04

## 2023-07-04 PROBLEM — R87.810 ASCUS WITH POSITIVE HIGH RISK HPV CERVICAL: Status: ACTIVE | Noted: 2023-07-04

## 2023-07-18 PROBLEM — G43.009 MIGRAINE WITHOUT AURA AND WITHOUT STATUS MIGRAINOSUS, NOT INTRACTABLE: Status: ACTIVE | Noted: 2023-07-18

## 2023-09-21 ENCOUNTER — OFFICE VISIT (OUTPATIENT)
Dept: OBSTETRICS AND GYNECOLOGY | Facility: CLINIC | Age: 43
End: 2023-09-21
Payer: MEDICAID

## 2023-09-21 VITALS — WEIGHT: 200 LBS | DIASTOLIC BLOOD PRESSURE: 80 MMHG | BODY MASS INDEX: 33.28 KG/M2 | SYSTOLIC BLOOD PRESSURE: 120 MMHG

## 2023-09-21 DIAGNOSIS — N83.201 BILATERAL OVARIAN CYSTS: ICD-10-CM

## 2023-09-21 DIAGNOSIS — N97.9 SECONDARY FEMALE INFERTILITY: ICD-10-CM

## 2023-09-21 DIAGNOSIS — N39.0 URINARY TRACT INFECTION WITHOUT HEMATURIA, SITE UNSPECIFIED: Primary | ICD-10-CM

## 2023-09-21 DIAGNOSIS — D25.9 UTERINE LEIOMYOMA, UNSPECIFIED LOCATION: ICD-10-CM

## 2023-09-21 DIAGNOSIS — N83.202 BILATERAL OVARIAN CYSTS: ICD-10-CM

## 2023-09-21 PROCEDURE — 81001 URINALYSIS AUTO W/SCOPE: CPT | Performed by: OBSTETRICS & GYNECOLOGY

## 2023-09-21 PROCEDURE — 87077 CULTURE AEROBIC IDENTIFY: CPT | Performed by: OBSTETRICS & GYNECOLOGY

## 2023-09-21 PROCEDURE — 87086 URINE CULTURE/COLONY COUNT: CPT | Performed by: OBSTETRICS & GYNECOLOGY

## 2023-09-21 RX ORDER — NITROFURANTOIN 25; 75 MG/1; MG/1
100 CAPSULE ORAL 2 TIMES DAILY
Qty: 14 CAPSULE | Refills: 0 | Status: SHIPPED | OUTPATIENT
Start: 2023-09-21 | End: 2023-09-28

## 2023-09-21 NOTE — PROGRESS NOTES
Subjective   Chief Complaint   Patient presents with    Follow-up     US done today for ovarian cyst follow-up / pain & burning when she is using the bathroom     Bernice Camilo is a 43 y.o. year old .  Patient's last menstrual period was 2023 (exact date).  She presents to be seen because of follow-up ultrasound in regards to history of uterine fibroids and bilateral ovarian cyst.    Patient's last menstrual period was 2023 (exact date).  Duration 3-5 days  Reports no issues or concerns with her menstrual cycles and they occur monthly.    She does desire to conceive.  Her last miscarriage was in .    She also reports significant issues with going to the bathroom since yesterday including burning with urination.      OTHER THINGS SHE WANTS TO DISCUSS TODAY:  Nothing else    The following portions of the patient's history were reviewed and updated as appropriate:current medications and allergies    Social History    Tobacco Use      Smoking status: Never      Smokeless tobacco: Never    Review of Systems  Constitutional POS: nothing reported    NEG: anorexia or night sweats   Genitourinary POS: dysuria    NEG: hematuria   Gastointestinal POS: nothing reported    NEG: bloating, change in bowel habits, melena, or reflux symptoms   Integument POS: nothing reported    NEG: moles that are changing in size, shape, color or rashes   Breast POS: nothing reported    NEG: persistent breast lump, skin dimpling, or nipple discharge         Objective   /80 (BP Location: Left arm, Patient Position: Sitting, Cuff Size: Large Adult)   Wt 90.7 kg (200 lb)   LMP 2023 (Exact Date)   BMI 33.28 kg/m²     General:  well developed; well nourished  no acute distress   Skin:  Not performed.   Thyroid: not examined   Lungs:  breathing is unlabored   Heart:  Not performed.   Breasts:  Not performed.   Abdomen: Not performed.   Pelvis: Not performed.     Lab Review   Colpo biopsies     Imaging    Pelvic ultrasound report        Assessment   Bilateral ovarian cysts that are overall stable in appearance  Dysuria with concern for UTI  Fibroid uterus with overall stable appearance on ultrasound  Secondary female infertility      Plan   The following tests were ordered today: UA with culture if indicated and TSH, PRL, FSH .  It was explained to Bernice that all lab test should be back within the one week after they are performed. She will be notified about the results, regardless of the findings. If she has not been contacted by the office within 2 weeks after the test has been performed, it is her responsibility to contact us to learn about her results.  Recommend repeat ultrasound for follow-up of #1 and #3 in about 6 to 8 weeks.  Folic acid for the prevention of neural tube defects was discussed.  At least 0.4 mg should be taken preconceptionally to reduce the risk.  It was explained that this may reduce the baseline incidence of neural tube defect by as much as 65%.  Folic acid can be found in OTC multivitamins, OTC prenatal vitamins or breakfast cereal that that contains 100% of the RDA of folate.  The importance of keeping all planned follow-up and taking all medications as prescribed was emphasized.  Follow up in ~ 6-8 weeks with ultrasound     New Medications Ordered This Visit   Medications    nitrofurantoin, macrocrystal-monohydrate, (Macrobid) 100 MG capsule     Sig: Take 1 capsule by mouth 2 (Two) Times a Day for 7 days.     Dispense:  14 capsule     Refill:  0          This note was electronically signed.    Beth Harden MD  September 21, 2023

## 2023-09-22 LAB
BACTERIA UR QL AUTO: ABNORMAL /HPF
BILIRUB UR QL STRIP: NEGATIVE
CLARITY UR: ABNORMAL
COLOR UR: YELLOW
GLUCOSE UR STRIP-MCNC: NEGATIVE MG/DL
HGB UR QL STRIP.AUTO: ABNORMAL
HOLD SPECIMEN: NORMAL
HYALINE CASTS UR QL AUTO: ABNORMAL /LPF
KETONES UR QL STRIP: NEGATIVE
LEUKOCYTE ESTERASE UR QL STRIP.AUTO: ABNORMAL
NITRITE UR QL STRIP: NEGATIVE
PH UR STRIP.AUTO: 7.5 [PH] (ref 5–8)
PROT UR QL STRIP: ABNORMAL
RBC # UR STRIP: ABNORMAL /HPF
REF LAB TEST METHOD: ABNORMAL
SP GR UR STRIP: 1.02 (ref 1–1.03)
SQUAMOUS #/AREA URNS HPF: ABNORMAL /HPF
UROBILINOGEN UR QL STRIP: ABNORMAL
WBC # UR STRIP: ABNORMAL /HPF

## 2023-09-24 LAB — BACTERIA SPEC AEROBE CULT: ABNORMAL

## 2023-09-25 ENCOUNTER — TELEPHONE (OUTPATIENT)
Age: 43
End: 2023-09-25

## 2023-09-25 ENCOUNTER — LAB (OUTPATIENT)
Dept: LAB | Facility: HOSPITAL | Age: 43
End: 2023-09-25
Payer: MEDICAID

## 2023-09-25 DIAGNOSIS — N97.9 SECONDARY FEMALE INFERTILITY: ICD-10-CM

## 2023-09-25 LAB
FSH SERPL-ACNC: 10.5 MIU/ML
PROLACTIN SERPL-MCNC: 8.72 NG/ML (ref 4.79–23.3)
TSH SERPL DL<=0.05 MIU/L-ACNC: 1.15 UIU/ML (ref 0.27–4.2)

## 2023-09-25 PROCEDURE — 83001 ASSAY OF GONADOTROPIN (FSH): CPT

## 2023-09-25 PROCEDURE — 84146 ASSAY OF PROLACTIN: CPT

## 2023-09-25 PROCEDURE — 84443 ASSAY THYROID STIM HORMONE: CPT

## 2023-09-25 NOTE — TELEPHONE ENCOUNTER
OK FOR HUB TO READ    ER FU WAS SCHEDULED FOR THIS AFTERNOON. DR RIGGINS CAN'T SEE AN ER FU IF ITS WORKERS COMP. WILL NEED TO R/S WITH AN OCCUPATIONAL MEDICINE OFFICE.

## 2023-09-27 DIAGNOSIS — N97.9 SECONDARY FEMALE INFERTILITY: Primary | ICD-10-CM

## 2023-10-11 ENCOUNTER — LAB (OUTPATIENT)
Dept: LAB | Facility: HOSPITAL | Age: 43
End: 2023-10-11
Payer: MEDICAID

## 2023-10-11 DIAGNOSIS — N97.9 SECONDARY FEMALE INFERTILITY: ICD-10-CM

## 2023-10-11 PROCEDURE — 84144 ASSAY OF PROGESTERONE: CPT

## 2023-10-12 LAB — PROGEST SERPL-MCNC: 7.27 NG/ML

## 2023-10-19 ENCOUNTER — OFFICE VISIT (OUTPATIENT)
Age: 43
End: 2023-10-19
Payer: MEDICAID

## 2023-10-19 VITALS
DIASTOLIC BLOOD PRESSURE: 86 MMHG | BODY MASS INDEX: 33.89 KG/M2 | OXYGEN SATURATION: 99 % | HEIGHT: 65 IN | HEART RATE: 70 BPM | WEIGHT: 203.4 LBS | SYSTOLIC BLOOD PRESSURE: 126 MMHG

## 2023-10-19 DIAGNOSIS — G89.29 CHRONIC ABDOMINAL PAIN: ICD-10-CM

## 2023-10-19 DIAGNOSIS — L30.4 INTERTRIGO: ICD-10-CM

## 2023-10-19 DIAGNOSIS — L60.8 MELANONYCHIA: ICD-10-CM

## 2023-10-19 DIAGNOSIS — Z23 ENCOUNTER FOR IMMUNIZATION: ICD-10-CM

## 2023-10-19 DIAGNOSIS — R10.9 CHRONIC ABDOMINAL PAIN: ICD-10-CM

## 2023-10-19 DIAGNOSIS — E11.65 TYPE 2 DIABETES MELLITUS WITH HYPERGLYCEMIA, WITHOUT LONG-TERM CURRENT USE OF INSULIN: Primary | ICD-10-CM

## 2023-10-19 LAB
EXPIRATION DATE: ABNORMAL
HBA1C MFR BLD: 6.3 % (ref 4.5–5.7)
Lab: ABNORMAL

## 2023-10-19 RX ORDER — DICYCLOMINE HCL 20 MG
20 TABLET ORAL EVERY 6 HOURS PRN
Qty: 30 TABLET | Refills: 1 | Status: SHIPPED | OUTPATIENT
Start: 2023-10-19

## 2023-10-19 NOTE — LETTER
[facility]  Vaccine Consent Form    Patient Name:  Bernice Camilo  Patient :  1980     Vaccine(s) Ordered    HPV Vaccine        Screening Checklist  The following questions should be completed prior to vaccination. If you answer “yes” to any question, it does not necessarily mean you should not be vaccinated. It just means we may need to clarify or ask more questions. If a question is unclear, please ask your healthcare provider to explain it.    Yes No   Any fever or moderate to severe illness today (mild illness and/or antibiotic treatment are not contraindications)?     Do you have a history of a serious reaction to any previous vaccinations, such as anaphylaxis, encephalopathy within 7 days, Guillain-[place] syndrome within 6 weeks, seizure?     Have you received any live vaccine(s) in the past month (MMR, ABBIE)?     Do you have an anaphylactic allergy to latex (DTaP, DTaP-IPV, Hep A, Hep B, MenB, RV, Td, Tdap), baker’s yeast (Hep B, HPV), or gelatin (ABBIE, MMR)?     Do you have an anaphylactic allergy to neomycin (Rabies, ABBIE, MMR, IPV, Hep A), polymyxin B (IPV), or streptomycin (IPV)?      Any cancer, leukemia, AIDS, or other immune system disorder? (ABBIE, MMR, RV)     Do you have a parent, brother, or sister with an immune system problem (if immune competence of vaccine recipient clinically verified, can proceed)? (MMR, ABBIE)     Any recent steroid treatments for >2 weeks, chemotherapy, or radiation treatment? (ABBIE, MMR)     Have you received antibody-containing blood transfusions or IVIG in the past 11 months (recommended interval is dependent on product)? (MMR, ABBIE)     Have you taken antiviral drugs (acyclovir, famciclovir, valacyclovir) in the last 24 or 48 hours, respectively (ABBIE)?      Are you pregnant or planning to become pregnant within 1 month? (ABBIE, MMR, HPV, IPV, MenB; For hep B- refer to Engerix-B)     For infants, have you ever been told your child has had intussusception or a  medical emergency involving obstruction of the intestine (RV)? If not for an infant, can skip this question.         *Ordering Physician/APC should be consulted if “yes” is checked by the patient or guardian above.      I have received, read, and understand the Vaccine Information Statement (VIS) for each vaccine ordered above.  I have considered my health status as well as the health status of my close contacts.  I have taken the opportunity to discuss my vaccine questions with my health care provider.   I have requested that the ordered vaccine(s) be given to me.  I understand the benefits and risks of the vaccines.  I understand that I should remain in the clinic for 15 minutes after receiving the vaccine(s).  _________________________________________________________  Signature of Patient or Parent/Legal Guardian ____________________  Date

## 2023-10-19 NOTE — PROGRESS NOTES
Office Note     Name: Bernice Camilo    : 1980     MRN: [id number]     Chief Complaint  Diabetes and Nausea    Subjective     History of Present Illness:  Bernice Camilo is a 43 y.o. female who presents today for follow-up of chronic conditions including diabetes, Candida/tinea.  She has several complaints including her chronic lower abdominal/pelvic pain.  Has had a pelvic ultrasound in the past showing cysts and fibroids, which may be a source of her pain.  She also has a persistent genital rash which is intensely itchy and involves intertriginous areas.  She has not tried any prescription creams for this.  She also notes melanonychia on at least 3 nails on separate hands for the last couple of months.  Patient has not been taking Januvia due to prohibitive cost since running out of insurance.  She also requests her second dose of the HPV vaccine  Past Medical History:   Past Medical History:   Diagnosis Date   • Diabetes mellitus    • Hyperlipidemia    • Hypertension        Past Surgical History:   Past Surgical History:   Procedure Laterality Date   • HERNIA REPAIR      umbilical   • MYOMECTOMY N/A 3/18/2021    Procedure: ABDOMINAL MYOMECTOMY;  Surgeon: Tuyet Alfonso MD;  [facility]: [facility];  Service: Obstetrics/Gynecology;  Laterality: N/A;   • UTERINE FIBROID SURGERY  2021       Immunizations:   Immunization History   Administered Date(s) Administered   • COVID-19 (PFIZER) Purple Cap Monovalent 2021, 2021   • Flu Vaccine Split Quad 10/01/2018, 2019, 2021   • Fluzone (or Fluarix & Flulaval for VFC) >6mos 10/01/2018, 2019, 2021   • Hpv9 2023, 10/19/2023   • Pneumococcal Conjugate 20-Valent (PCV20) 2023   • Pneumococcal Polysaccharide (PPSV23) 2018   • Tdap 2019        Medications:     Current Outpatient Medications:   •  Acetaminophen Extra Strength 500 MG tablet, As Needed., Disp: , Rfl:   •  atorvastatin  "(LIPITOR) 40 MG tablet, Take 1 tablet by mouth Daily., Disp: 90 tablet, Rfl: 3  •  FREESTYLE LITE test strip, CHECK BLOOD SUGARS WHEN FEELING LOW, Disp: 100 each, Rfl: 3  •  ibuprofen (ADVIL,MOTRIN) 600 MG tablet, Take 1 tablet by mouth Every 8 (Eight) Hours As Needed for Headache., Disp: 90 tablet, Rfl: 1  •  Jardiance 25 MG tablet tablet, Take 1 tablet by mouth Daily., Disp: 90 tablet, Rfl: 1  •  Lancets (freestyle) lancets, Once a day testing E11.65, Disp: 100 each, Rfl: 12  •  lisinopril (PRINIVIL,ZESTRIL) 5 MG tablet, Take 1 tablet by mouth Daily., Disp: 90 tablet, Rfl: 1  •  metFORMIN ER (GLUCOPHAGE-XR) 500 MG 24 hr tablet, Take 2 tablets by mouth 2 (Two) Times a Day Before Meals., Disp: 360 tablet, Rfl: 1  •  SITagliptin (Januvia) 100 MG tablet, Take 1 tablet by mouth Daily., Disp: 90 tablet, Rfl: 1  •  dicyclomine (BENTYL) 20 MG tablet, Take 1 tablet by mouth Every 6 (Six) Hours As Needed for Abdominal Cramping (abdominal cramping)., Disp: 30 tablet, Rfl: 1  •  miconazole (Micatin) 2 % cream, Apply 1 application  topically to the appropriate area as directed 2 (Two) Times a Day., Disp: 56 g, Rfl: 3    Allergies:   No Known Allergies    Family History:   Family History   Problem Relation Age of Onset   • Migraine headaches Sister    • Breast cancer Neg Hx    • Ovarian cancer Neg Hx    • Colon cancer Neg Hx    • Prostate cancer Neg Hx    • Pancreatitis Neg Hx        Social History:   Social History     Socioeconomic History   • Marital status:    • Number of children: 3   • Highest education level: High school graduate   Tobacco Use   • Smoking status: Never   • Smokeless tobacco: Never   Vaping Use   • Vaping Use: Never used   Substance and Sexual Activity   • Alcohol use: Yes     Comment: 1/week    • Drug use: Never   • Sexual activity: Yes     Partners: Male         Objective     Vital Signs  /86   Pulse 70   Ht 165.1 cm (65\")   Wt 92.3 kg (203 lb 6.4 oz)   SpO2 99%   BMI 33.85 kg/m² " "  Estimated body mass index is 33.85 kg/m² as calculated from the following:    Height as of this encounter: 165.1 cm (65\").    Weight as of this encounter: 92.3 kg (203 lb 6.4 oz).            Physical Exam  Constitutional:       General: She is not in acute distress.     Appearance: She is not toxic-appearing.   Cardiovascular:      Rate and Rhythm: Normal rate and regular rhythm.      Heart sounds: No murmur heard.     No friction rub. No gallop.   Pulmonary:      Effort: Pulmonary effort is normal.      Breath sounds: Normal breath sounds.   Abdominal:      General: Abdomen is flat. There is no distension.      Tenderness: There is abdominal tenderness (Mild suprapubic).   Skin:     General: Skin is warm and dry.   Neurological:      Mental Status: She is alert.   Psychiatric:         Mood and Affect: Mood normal.         Behavior: Behavior normal.          Assessment and Plan     1. Type 2 diabetes mellitus with hyperglycemia, without long-term current use of insulin  Chronic, stable  -A1c 6.3  -On metformin, no longer taking Jardiance or Januvia due to cost per patient  -We will continue metformin given a stable A1c which is at goal  - POC Glycosylated Hemoglobin (Hb A1C)    2. Melanonychia  New  -Melanonychia multiple nails in this non-smoker  -We will refer to dermatology for further evaluation  - Ambulatory Referral to Dermatology    3. Encounter for immunization  Second dose HPV provided today  - HPV Vaccine    4. Chronic abdominal pain  Suspect this is due to ovarian cysts and/or fibroids, however there is some spasm-like general abdominal pain which occurs intermittently as well.  She is no longer having dysuria or frequency as she was when she was treated for UTI  -We will try some Bentyl for crampy pain, she has a ultrasound scheduled with GYN in 3-4 weeks  - dicyclomine (BENTYL) 20 MG tablet; Take 1 tablet by mouth Every 6 (Six) Hours As Needed for Abdominal Cramping (abdominal cramping).  Dispense: 30 " tablet; Refill: 1    5. Intertrigo  Chronic, uncontrolled  -Will Rx miconazole topical for 2 to 4 weeks  - miconazole (Micatin) 2 % cream; Apply 1 application  topically to the appropriate area as directed 2 (Two) Times a Day.  Dispense: 56 g; Refill: 3       Counseling was given to patient for the following topics: instructions for management.    Follow Up  Return in about 3 months (around 1/19/2024) for with Dr. RIGGINS, Follow Up.    Daniel Adame MD  [facility]  [address] [address] [phone]

## 2023-11-02 ENCOUNTER — TELEPHONE (OUTPATIENT)
Age: 43
End: 2023-11-02

## 2023-11-13 RX ORDER — NITROFURANTOIN 25; 75 MG/1; MG/1
CAPSULE ORAL
Qty: 14 CAPSULE | Refills: 0 | OUTPATIENT
Start: 2023-11-13

## 2023-11-16 ENCOUNTER — OFFICE VISIT (OUTPATIENT)
Dept: OBSTETRICS AND GYNECOLOGY | Facility: CLINIC | Age: 43
End: 2023-11-16

## 2023-11-16 VITALS
DIASTOLIC BLOOD PRESSURE: 78 MMHG | HEIGHT: 65 IN | BODY MASS INDEX: 33.66 KG/M2 | WEIGHT: 202 LBS | SYSTOLIC BLOOD PRESSURE: 122 MMHG

## 2023-11-16 DIAGNOSIS — D25.9 UTERINE LEIOMYOMA, UNSPECIFIED LOCATION: ICD-10-CM

## 2023-11-16 DIAGNOSIS — N83.202 BILATERAL OVARIAN CYSTS: Primary | ICD-10-CM

## 2023-11-16 DIAGNOSIS — N97.9 SECONDARY FEMALE INFERTILITY: ICD-10-CM

## 2023-11-16 DIAGNOSIS — N83.201 BILATERAL OVARIAN CYSTS: Primary | ICD-10-CM

## 2023-11-16 NOTE — PROGRESS NOTES
"Subjective   Chief Complaint   Patient presents with    Follow-up     follow up after US     Bernice Camilo is a 43 y.o. year old .  Patient's last menstrual period was 2023 (approximate).  She presents to be seen for follow up on ovarian cysts and uterine fibroids.   She states she has occasional right lower quadrant pain.   At her last visit in Sept with Dr Harden she discussed her fertility concerns, referral was made to [facility]. She was waiting for new insurance coverage to schedule appt. She states she would like to have consult scheduled and will pay cash price for first visit.     The following portions of the patient's history were reviewed and updated as appropriate:current medications and allergies    Social History    Tobacco Use      Smoking status: Never      Smokeless tobacco: Never         Objective   /78 (BP Location: Left arm, Patient Position: Sitting, Cuff Size: Adult)   Ht 165.1 cm (65\")   Wt 91.6 kg (202 lb)   LMP 2023 (Approximate)   BMI 33.61 kg/m²     Lab Review   No data reviewed    Imaging   Pelvic ultrasound report   Tvus in office today shows resolution of previously noted ovarian cysts  Fibroids stable   Mild free fluid noted in cul de sac     Assessment   Uterine fibroids  Bilateral ovarian cysts- resolved  Infertility      Plan   Reviewed ultrasound findings with patient  Will resend referral for [facility] at patient's request   The importance of keeping all planned follow-up and taking all medications as prescribed was emphasized.  Rtc for annual or prn      No orders of the defined types were placed in this encounter.         This note was electronically signed.    Olena Lopez, APRN  2023        "

## 2024-03-04 NOTE — TELEPHONE ENCOUNTER
Rx Refill Note  Requested Prescriptions     Pending Prescriptions Disp Refills    FREESTYLE LITE test strip [Pharmacy Med Name: FREESTYLE LITE TEST STRIP]  4     Sig: CHECK BLOOD SUGARS WHEN FEELING LOW      Last office visit with prescribing clinician: 7/18/2023   Last telemedicine visit with prescribing clinician: Visit date not found   Next office visit with prescribing clinician: Visit date not found                         Would you like a call back once the refill request has been completed: [] Yes [] No    If the office needs to give you a call back, can they leave a voicemail: [] Yes [] No    Abby Sparrow MA  03/04/24, 08:24 EST    Patient was to Return in about 3 months (around 1/19/2024) for with Dr. RIGGINS Follow Up.     Called and left  for patient to return call    OK FOR HUB TO RELAY MESSAGE AND SCHEDULE APPOINTMENT

## 2024-03-05 NOTE — TELEPHONE ENCOUNTER
2nd attempt    Patient was to Return in about 3 months (around 1/19/2024) for with Dr. RIGGINS Follow Up.      Called and left vm for patient to return call     OK FOR HUB TO RELAY MESSAGE AND SCHEDULE APPOINTMENT

## 2024-03-06 RX ORDER — BLOOD-GLUCOSE METER
KIT MISCELLANEOUS
Qty: 50 EACH | Refills: 11 | Status: SHIPPED | OUTPATIENT
Start: 2024-03-06

## 2024-03-06 NOTE — TELEPHONE ENCOUNTER
3rd attempt    2nd attempt     Patient was to Return in about 3 months (around 1/19/2024) for with Dr. RIGGINS Follow Up.      Called and left  for patient to return call     OK FOR HUB TO RELAY MESSAGE AND SCHEDULE APPOINTMENT

## 2024-03-13 NOTE — PROGRESS NOTES
Subjective   Chief Complaint   Patient presents with    Follow-up     Repeat pap     Bernice Camilo is a 44 y.o. year old .  Patient's last menstrual period was 2024 (exact date).  She presents for repeat Pap smear due to history of ASCUS with positive HPV with negative biopsies in 2023.  Today she reports doing well. However she was supposed to receive a referral to [facility] at her last appointment, but never received a phone call from them.       The following portions of the patient's history were reviewed and updated as appropriate:current medications, allergies, and past medical history    Social History    Tobacco Use      Smoking status: Never      Smokeless tobacco: Never         Objective   /76 (BP Location: Right arm, Patient Position: Sitting, Cuff Size: Adult)   Resp 16   Wt 95.5 kg (210 lb 9.6 oz)   LMP 2024 (Exact Date)   BMI 35.05 kg/m²     General:  well developed; well nourished  no acute distress   Lungs:  breathing is unlabored   Heart:  Not performed.   Pelvis: Clinical staff was present for exam  External genitalia:  normal appearance of the external genitalia including Bartholin's and Raritan's glands.  :  urethral meatus normal;  Vaginal:  normal pink mucosa without prolapse or lesions.  Cervix:  normal appearance.     Lab Review   PAP    Imaging   No data reviewed        Assessment   History of ASCUS, +HPV pap smear   Secondary infertility      Plan   Pap with HPV was done today due to previous abnormal.  If she does not receive the results of the Pap within 2 weeks  time, she was instructed to call to find out the results.  I explained to Bernice that because she is being seen in follow-up of a previously abnormal Pap smear, her next Pap needs to be performed in 4-6 months.  She will need to be seen roughly every 6 months until 3 consecutive normal Paps have been obtained.  At that point, she can return to routine screening intervals.   Upon chart  review, referral to [facility] never placed.  Referral placed urgently today to IR for [facility] consultation due to age, and patient given phone number for office.   The importance of keeping all planned follow-up and taking all medications as prescribed was emphasized.  Follow up for annual exam or sooner PRN     No orders of the defined types were placed in this encounter.         This note was electronically signed.    Rosalba Gutierres MD  March 14, 2024

## 2024-03-14 ENCOUNTER — OFFICE VISIT (OUTPATIENT)
Dept: OBSTETRICS AND GYNECOLOGY | Facility: CLINIC | Age: 44
End: 2024-03-14
Payer: COMMERCIAL

## 2024-03-14 VITALS
RESPIRATION RATE: 16 BRPM | DIASTOLIC BLOOD PRESSURE: 76 MMHG | BODY MASS INDEX: 35.05 KG/M2 | SYSTOLIC BLOOD PRESSURE: 112 MMHG | WEIGHT: 210.6 LBS

## 2024-03-14 DIAGNOSIS — D25.9 UTERINE LEIOMYOMA, UNSPECIFIED LOCATION: ICD-10-CM

## 2024-03-14 DIAGNOSIS — R87.810 ATYPICAL SQUAMOUS CELL CHANGES OF UNDETERMINED SIGNIFICANCE (ASCUS) ON CERVICAL CYTOLOGY WITH POSITIVE HIGH RISK HUMAN PAPILLOMA VIRUS (HPV): Primary | ICD-10-CM

## 2024-03-14 DIAGNOSIS — R87.610 ATYPICAL SQUAMOUS CELL CHANGES OF UNDETERMINED SIGNIFICANCE (ASCUS) ON CERVICAL CYTOLOGY WITH POSITIVE HIGH RISK HUMAN PAPILLOMA VIRUS (HPV): Primary | ICD-10-CM

## 2024-03-14 DIAGNOSIS — N97.9 SECONDARY FEMALE INFERTILITY: ICD-10-CM

## 2024-03-21 LAB — REF LAB TEST METHOD: NORMAL

## 2024-03-22 ENCOUNTER — TELEPHONE (OUTPATIENT)
Dept: OBSTETRICS AND GYNECOLOGY | Facility: CLINIC | Age: 44
End: 2024-03-22
Payer: COMMERCIAL

## 2024-05-22 ENCOUNTER — TELEPHONE (OUTPATIENT)
Age: 44
End: 2024-05-22

## 2024-05-22 ENCOUNTER — OFFICE VISIT (OUTPATIENT)
Age: 44
End: 2024-05-22
Payer: COMMERCIAL

## 2024-05-22 VITALS
HEART RATE: 74 BPM | TEMPERATURE: 97.5 F | DIASTOLIC BLOOD PRESSURE: 76 MMHG | WEIGHT: 210 LBS | BODY MASS INDEX: 34.99 KG/M2 | SYSTOLIC BLOOD PRESSURE: 110 MMHG | OXYGEN SATURATION: 97 % | HEIGHT: 65 IN

## 2024-05-22 DIAGNOSIS — R10.31 RLQ ABDOMINAL PAIN: ICD-10-CM

## 2024-05-22 DIAGNOSIS — E11.65 TYPE 2 DIABETES MELLITUS WITH HYPERGLYCEMIA, WITHOUT LONG-TERM CURRENT USE OF INSULIN: Primary | ICD-10-CM

## 2024-05-22 LAB
ALBUMIN UR-MCNC: <1.2 MG/DL
EXPIRATION DATE: ABNORMAL
HBA1C MFR BLD: 6.7 % (ref 4.5–5.7)
Lab: ABNORMAL

## 2024-05-22 PROCEDURE — 82043 UR ALBUMIN QUANTITATIVE: CPT | Performed by: FAMILY MEDICINE

## 2024-05-22 PROCEDURE — 83036 HEMOGLOBIN GLYCOSYLATED A1C: CPT | Performed by: FAMILY MEDICINE

## 2024-05-22 PROCEDURE — 99214 OFFICE O/P EST MOD 30 MIN: CPT | Performed by: FAMILY MEDICINE

## 2024-05-22 RX ORDER — METFORMIN HYDROCHLORIDE 500 MG/1
1000 TABLET, EXTENDED RELEASE ORAL
Qty: 360 TABLET | Refills: 1 | Status: SHIPPED | OUTPATIENT
Start: 2024-05-22

## 2024-05-22 RX ORDER — BLOOD-GLUCOSE METER
KIT MISCELLANEOUS
Qty: 50 EACH | Refills: 11 | Status: SHIPPED | OUTPATIENT
Start: 2024-05-22

## 2024-05-22 NOTE — PROGRESS NOTES
"Chief Complaint  Abdominal Pain, Diabetes, and Hot Flashes    Subjective        Bernice Camilo presents to [facility]  Abdominal Pain  This is a recurrent problem. The current episode started in the past 7 days. The pain is located in the generalized abdominal region. The quality of the pain is cramping. Pertinent negatives include no constipation, diarrhea, nausea or vomiting.   Diabetes      She was out of Januvia for 2 days. Cost of medication she couldn't afford.  Taking metformin and Jardiance.     She feels abdominal pain in the past week. She started getting super hot 5/19/24. Increased sweating. She feels very hot and hard to get comfortable. No current abdominal pain. April she had cramping with menses. In May no cramping with menses. LMP 5/1/24 for 3 days. Bm daily usually.         Objective   Vital Signs:  /76 (BP Location: Right arm, Patient Position: Sitting, Cuff Size: Large Adult)   Pulse 74   Temp 97.5 °F (36.4 °C) (Temporal)   Ht 165.1 cm (65\")   Wt 95.3 kg (210 lb)   SpO2 97%   BMI 34.95 kg/m²   Estimated body mass index is 34.95 kg/m² as calculated from the following:    Height as of this encounter: 165.1 cm (65\").    Weight as of this encounter: 95.3 kg (210 lb).             Physical Exam  Vitals reviewed.   Constitutional:       General: She is not in acute distress.     Appearance: She is obese. She is not ill-appearing, toxic-appearing or diaphoretic.   Cardiovascular:      Rate and Rhythm: Normal rate and regular rhythm.   Pulmonary:      Effort: Pulmonary effort is normal.      Breath sounds: Normal breath sounds.   Abdominal:      General: Bowel sounds are normal. There is no distension.      Palpations: Abdomen is soft. There is no mass.      Tenderness: There is abdominal tenderness (mild) in the right lower quadrant. There is no guarding or rebound.      Hernia: No hernia is present.   Neurological:      Mental Status: She is alert. "        Result Review :    The following data was reviewed by: Eugenia Jean MD on 05/22/2024:  Common labs          7/18/2023    08:52 10/19/2023    09:50 5/22/2024    14:24   Common Labs   Hemoglobin A1C 5.9  6.3  6.7      A1C Last 3 Results          7/18/2023    08:52 10/19/2023    09:50 5/22/2024    14:24   HGBA1C Last 3 Results   Hemoglobin A1C 5.9  6.3  6.7                   Assessment and Plan     Diagnoses and all orders for this visit:    1. Type 2 diabetes mellitus with hyperglycemia, without long-term current use of insulin (Primary)  -     glucose blood (FREESTYLE LITE) test strip; Use as instructed  Dispense: 50 each; Refill: 11  -     POC Glycosylated Hemoglobin (Hb A1C)  -     MicroAlbumin, Urine, Random - Urine, Clean Catch; Future  -     empagliflozin (Jardiance) 25 MG tablet tablet; Take 1 tablet by mouth Daily.  Dispense: 90 tablet; Refill: 1  -     metFORMIN ER (GLUCOPHAGE-XR) 500 MG 24 hr tablet; Take 2 tablets by mouth 2 (Two) Times a Day Before Meals.  Dispense: 360 tablet; Refill: 1  -     SITagliptin (Januvia) 100 MG tablet; Take 1 tablet by mouth Daily.  Dispense: 90 tablet; Refill: 1  -     MicroAlbumin, Urine, Random - Urine, Clean Catch  Uncontrolled.  Patient is on triple oral therapy and still not at goal.  I recommend adding Aram urea but she declined.  Recommend following a diabetic diet reducing sugar and carbohydrates.  Reassess in 3 months and if still elevated will need to add a fourth agent.  2. RLQ abdominal pain  -     CT Abdomen Pelvis With & Without Contrast; Future  New.  Mild tenderness on exam.  Differential diagnosis includes ovarian cyst, diverticulitis, appendicitis, kidney stone.  Further evaluation with CT scan.           Follow Up     Return in about 3 months (around 8/22/2024) for Office visit diabetes .  Patient was given instructions and counseling regarding her condition or for health maintenance advice. Please see specific information pulled into the AVS  if appropriate.     Electronically signed by Eugenia Jean MD, 05/22/24, 2:33 PM EDT.

## 2024-05-22 NOTE — TELEPHONE ENCOUNTER
RELAY    Called and lvm for patient. CT is tomorrow, 5/23 at 9:30. She needs to arrive by 8:30 for registration. Nothing to eat or drink after 6:30am. Address is [address] [place] [address], [address]

## 2024-07-31 ENCOUNTER — OFFICE VISIT (OUTPATIENT)
Age: 44
End: 2024-07-31
Payer: COMMERCIAL

## 2024-07-31 VITALS
WEIGHT: 211 LBS | SYSTOLIC BLOOD PRESSURE: 118 MMHG | HEIGHT: 65 IN | HEART RATE: 62 BPM | BODY MASS INDEX: 35.16 KG/M2 | DIASTOLIC BLOOD PRESSURE: 80 MMHG | OXYGEN SATURATION: 100 %

## 2024-07-31 DIAGNOSIS — M54.50 ACUTE BILATERAL LOW BACK PAIN WITHOUT SCIATICA: Primary | ICD-10-CM

## 2024-07-31 DIAGNOSIS — K08.89 PAIN, DENTAL: ICD-10-CM

## 2024-07-31 PROCEDURE — 99214 OFFICE O/P EST MOD 30 MIN: CPT | Performed by: FAMILY MEDICINE

## 2024-07-31 RX ORDER — MELOXICAM 7.5 MG/1
7.5 TABLET ORAL EVERY MORNING
Qty: 30 TABLET | Refills: 1 | Status: SHIPPED | OUTPATIENT
Start: 2024-07-31

## 2024-07-31 RX ORDER — CYCLOBENZAPRINE HCL 10 MG
10 TABLET ORAL NIGHTLY
Qty: 30 TABLET | Refills: 1 | Status: SHIPPED | OUTPATIENT
Start: 2024-07-31

## 2024-07-31 NOTE — PROGRESS NOTES
"Chief Complaint  Back Pain    Subjective        Bernice Camilo presents to [facility]  Back Pain  This is a new problem. The current episode started in the past 7 days. The pain is present in the lumbar spine (midline). The pain does not radiate. The symptoms are aggravated by standing. Associated symptoms include abdominal pain and chest pain. She has tried analgesics (topical ointment) for the symptoms. The treatment provided no relief.     Review of Systems   HENT:  Positive for dental problem and ear pain. Negative for sinus pressure.    Cardiovascular:  Positive for chest pain.   Gastrointestinal:  Positive for abdominal pain.   Musculoskeletal:  Positive for back pain.       Ear pain and tooth started hurting her. Two nights ago she had a huge wet in her chest that she wanted to grab. She had a sleepless night. The next morning she couldn't stand with her back. She couldn't walk yesterday or stand straight. She would fall back. This morning pain is not as bad as yesterday. When she ovulates she has a cramp in her belly for 7 days. She missed CT scan appointment. She has applied OTC ointment on her waist. Tylenol 3 times yesterday     Objective   Vital Signs:  /80   Pulse 62   Ht 165.1 cm (65\")   Wt 95.7 kg (211 lb)   SpO2 100%   BMI 35.11 kg/m²   Estimated body mass index is 35.11 kg/m² as calculated from the following:    Height as of this encounter: 165.1 cm (65\").    Weight as of this encounter: 95.7 kg (211 lb).             Physical Exam  Vitals reviewed.   Constitutional:       General: She is not in acute distress.     Appearance: She is not ill-appearing, toxic-appearing or diaphoretic.   HENT:      Right Ear: Tympanic membrane, ear canal and external ear normal.      Left Ear: Tympanic membrane, ear canal and external ear normal.      Mouth/Throat:      Mouth: Mucous membranes are moist. No oral lesions.      Dentition: Normal dentition. No gingival " swelling or dental abscesses.      Comments: No parotid swelling  Cardiovascular:      Rate and Rhythm: Normal rate and regular rhythm.   Pulmonary:      Effort: Pulmonary effort is normal.      Breath sounds: Normal breath sounds.   Musculoskeletal:      Lumbar back: Bony tenderness present. No deformity. Normal range of motion.        Back:    Lymphadenopathy:      Head:      Right side of head: No submandibular, tonsillar, preauricular or posterior auricular adenopathy.      Left side of head: No submandibular, tonsillar, preauricular or posterior auricular adenopathy.   Neurological:      Mental Status: She is alert.        Result Review :                     Assessment and Plan     Diagnoses and all orders for this visit:    1. Acute bilateral low back pain without sciatica (Primary)  -     meloxicam (MOBIC) 7.5 MG tablet; Take 1 tablet by mouth Every Morning. For pain  Dispense: 30 tablet; Refill: 1  -     cyclobenzaprine (FLEXERIL) 10 MG tablet; Take 1 tablet by mouth Every Night. For back pain  Dispense: 30 tablet; Refill: 1  New.  Start meloxicam in the morning with Flexeril at night.  Follow-up if not improving.  2. Pain, dental  -     meloxicam (MOBIC) 7.5 MG tablet; Take 1 tablet by mouth Every Morning. For pain  Dispense: 30 tablet; Refill: 1  New.  NSAIDs for pain relief.  No sign of infection or need for antibiotics.  Encouraged her to find a dentist in her insurance network.           Follow Up     Return in about 22 days (around 8/22/2024) for Office visit, as scheduled.  Patient was given instructions and counseling regarding her condition or for health maintenance advice. Please see specific information pulled into the AVS if appropriate.     Electronically signed by Eugenia Jean MD, 07/31/24, 8:30 AM EDT.

## 2024-08-30 DIAGNOSIS — E11.9 TYPE 2 DIABETES MELLITUS WITHOUT COMPLICATION, WITHOUT LONG-TERM CURRENT USE OF INSULIN: Chronic | ICD-10-CM

## 2024-08-30 RX ORDER — ATORVASTATIN CALCIUM 40 MG/1
40 TABLET, FILM COATED ORAL DAILY
Qty: 90 TABLET | Refills: 3 | Status: SHIPPED | OUTPATIENT
Start: 2024-08-30

## 2024-08-30 NOTE — TELEPHONE ENCOUNTER
Rx Refill Note  Requested Prescriptions     Pending Prescriptions Disp Refills    atorvastatin (LIPITOR) 40 MG tablet [Pharmacy Med Name: ATORVASTATIN 40 MG TABLET] 90 tablet 3     Sig: TAKE 1 TABLET BY MOUTH EVERY DAY      Last office visit with prescribing clinician: 7/31/2024   Last telemedicine visit with prescribing clinician: Visit date not found   Next office visit with prescribing clinician: 9/11/2024                         Would you like a call back once the refill request has been completed: [] Yes [] No    If the office needs to give you a call back, can they leave a voicemail: [] Yes [] No    Aileen Tee MA  08/30/24, 13:02 EDT

## 2024-09-11 ENCOUNTER — OFFICE VISIT (OUTPATIENT)
Age: 44
End: 2024-09-11
Payer: COMMERCIAL

## 2024-09-11 VITALS
HEART RATE: 80 BPM | BODY MASS INDEX: 35.16 KG/M2 | DIASTOLIC BLOOD PRESSURE: 76 MMHG | SYSTOLIC BLOOD PRESSURE: 124 MMHG | WEIGHT: 211 LBS | OXYGEN SATURATION: 98 % | HEIGHT: 65 IN

## 2024-09-11 DIAGNOSIS — K08.89 PAIN, DENTAL: ICD-10-CM

## 2024-09-11 DIAGNOSIS — E11.65 TYPE 2 DIABETES MELLITUS WITH HYPERGLYCEMIA, WITHOUT LONG-TERM CURRENT USE OF INSULIN: Primary | ICD-10-CM

## 2024-09-11 LAB
EXPIRATION DATE: ABNORMAL
HBA1C MFR BLD: 7.1 % (ref 4.5–5.7)
Lab: ABNORMAL

## 2024-09-11 PROCEDURE — 99214 OFFICE O/P EST MOD 30 MIN: CPT | Performed by: FAMILY MEDICINE

## 2024-09-11 PROCEDURE — 83036 HEMOGLOBIN GLYCOSYLATED A1C: CPT | Performed by: FAMILY MEDICINE

## 2024-09-11 RX ORDER — LANCETS 28 GAUGE
EACH MISCELLANEOUS
Qty: 100 EACH | Refills: 12 | Status: SHIPPED | OUTPATIENT
Start: 2024-09-11

## 2024-09-11 RX ORDER — BLOOD-GLUCOSE METER
KIT MISCELLANEOUS
Qty: 50 EACH | Refills: 11 | Status: SHIPPED | OUTPATIENT
Start: 2024-09-11

## 2024-09-11 RX ORDER — MELOXICAM 15 MG/1
15 TABLET ORAL EVERY MORNING
Qty: 30 TABLET | Refills: 1 | Status: SHIPPED | OUTPATIENT
Start: 2024-09-11

## 2024-09-11 RX ORDER — GLIPIZIDE 10 MG/1
10 TABLET, FILM COATED, EXTENDED RELEASE ORAL
Qty: 90 TABLET | Refills: 1 | Status: SHIPPED | OUTPATIENT
Start: 2024-09-11

## 2024-09-11 RX ORDER — BLOOD-GLUCOSE METER
1 KIT MISCELLANEOUS ONCE
Qty: 1 EACH | Refills: 0 | Status: SHIPPED | OUTPATIENT
Start: 2024-09-11 | End: 2024-09-11

## 2024-09-11 NOTE — PROGRESS NOTES
"Chief Complaint  Diabetes    Subjective        Bernice Camilo presents to [facility]  Diabetes    Follow-up  Conditions present:  Diabetes  Treatment barriers: medication cost.   Diabetes: Diabetes type: type 2.Current diabetic treatment: oral medications. Home blood testing: not monitored.     She is out of diabetes testing supplies. She is out of Januvia, cholesterol pills due to copay costs. She eats if she feels like eating in the morning she will. She has lunch daily.     She has dental pain and dentist said it could be wisdom teeth. Pain radiates to right ear and neck. She is taking Tylenol every 6 hours.       Objective   Vital Signs:  /76 (BP Location: Left arm, Patient Position: Sitting, Cuff Size: Adult)   Pulse 80   Ht 165.1 cm (65\")   Wt 95.7 kg (211 lb)   SpO2 98%   BMI 35.11 kg/m²   Estimated body mass index is 35.11 kg/m² as calculated from the following:    Height as of this encounter: 165.1 cm (65\").    Weight as of this encounter: 95.7 kg (211 lb).          Physical Exam  Vitals reviewed.   Constitutional:       General: She is not in acute distress.     Appearance: She is not ill-appearing, toxic-appearing or diaphoretic.   HENT:      Head:      Jaw: There is normal jaw occlusion. No trismus or swelling.      Salivary Glands: Right salivary gland is not diffusely enlarged or tender.        Right Ear: Tympanic membrane, ear canal and external ear normal.      Mouth/Throat:      Mouth: Mucous membranes are moist.      Dentition: Normal dentition. No gingival swelling, dental caries, dental abscesses or gum lesions.      Tongue: No lesions.      Pharynx: Oropharynx is clear. No uvula swelling.      Tonsils: No tonsillar abscesses.   Cardiovascular:      Rate and Rhythm: Normal rate and regular rhythm.   Pulmonary:      Effort: Pulmonary effort is normal.      Breath sounds: Normal breath sounds.   Neurological:      Mental Status: She is alert. "   Psychiatric:         Mood and Affect: Mood is depressed.        Result Review :  The following data was reviewed by: Eugenia Jean MD on 09/11/2024:  Common labs          10/19/2023    09:50 5/22/2024    14:24 5/22/2024    14:30 9/11/2024    11:25   Common Labs   Hemoglobin A1C 6.3  6.7   7.1    Microalbumin, Urine   <1.2       A1C Last 3 Results          10/19/2023    09:50 5/22/2024    14:24 9/11/2024    11:25   HGBA1C Last 3 Results   Hemoglobin A1C 6.3  6.7  7.1                Assessment and Plan   Diagnoses and all orders for this visit:    1. Type 2 diabetes mellitus with hyperglycemia, without long-term current use of insulin (Primary)  -     glipizide (GLUCOTROL XL) 10 MG 24 hr tablet; Take 1 tablet by mouth Daily Before Lunch.  Dispense: 90 tablet; Refill: 1  -     POC Glycosylated Hemoglobin (Hb A1C)  -     glucose monitor monitoring kit; Use 1 each 1 (One) Time for 1 dose. Use as directed  Dispense: 1 each; Refill: 0  -     glucose blood (FREESTYLE LITE) test strip; Use as instructed  Dispense: 50 each; Refill: 11  -     Lancets (freestyle) lancets; Once a day testing E11.65  Dispense: 100 each; Refill: 12    2. Pain, dental  -     meloxicam (MOBIC) 15 MG tablet; Take 1 tablet by mouth Every Morning. For pain  Dispense: 30 tablet; Refill: 1      Patient is unable to afford her medications.  Discontinue brand-name Januvia and Jardiance.  Continue metformin ER 1000 mg twice a day.  Add glipizide XL 10 mg with a meal and cautioned on hypoglycemia.  Home testing supplies ordered.  Recheck in 3 months.    Check fingerstick glucose in the morning before eating, goal <130.   Check fingerstick 2 hours after a meal, goal <180.       Increase NSAIDs and use Tylenol for breakthrough pain.  Continue with recommendation from dentist regarding wisdom teeth.  No signs of infection or need for antibiotics at this time.         Follow Up   Return in about 3 months (around 12/11/2024) for Office visit  diabetes.  Patient was given instructions and counseling regarding her condition or for health maintenance advice. Please see specific information pulled into the AVS if appropriate.         Electronically signed by Eugenia Jean MD, 09/11/24, 11:29 AM EDT.

## 2024-12-11 ENCOUNTER — OFFICE VISIT (OUTPATIENT)
Age: 44
End: 2024-12-11
Payer: COMMERCIAL

## 2024-12-11 VITALS
HEIGHT: 65 IN | HEART RATE: 75 BPM | WEIGHT: 205.5 LBS | OXYGEN SATURATION: 98 % | DIASTOLIC BLOOD PRESSURE: 80 MMHG | SYSTOLIC BLOOD PRESSURE: 118 MMHG | BODY MASS INDEX: 34.24 KG/M2

## 2024-12-11 DIAGNOSIS — E11.9 TYPE 2 DIABETES MELLITUS WITHOUT COMPLICATION, WITHOUT LONG-TERM CURRENT USE OF INSULIN: Primary | ICD-10-CM

## 2024-12-11 DIAGNOSIS — M25.562 CHRONIC PAIN OF LEFT KNEE: ICD-10-CM

## 2024-12-11 DIAGNOSIS — G89.29 CHRONIC PAIN OF LEFT KNEE: ICD-10-CM

## 2024-12-11 DIAGNOSIS — R00.2 PALPITATIONS: ICD-10-CM

## 2024-12-11 LAB
EXPIRATION DATE: ABNORMAL
HBA1C MFR BLD: 6.3 % (ref 4.5–5.7)
Lab: ABNORMAL

## 2024-12-11 PROCEDURE — 93000 ELECTROCARDIOGRAM COMPLETE: CPT | Performed by: FAMILY MEDICINE

## 2024-12-11 PROCEDURE — 99214 OFFICE O/P EST MOD 30 MIN: CPT | Performed by: FAMILY MEDICINE

## 2024-12-11 PROCEDURE — 83036 HEMOGLOBIN GLYCOSYLATED A1C: CPT | Performed by: FAMILY MEDICINE

## 2024-12-11 RX ORDER — METFORMIN HYDROCHLORIDE 500 MG/1
1000 TABLET, EXTENDED RELEASE ORAL
Qty: 360 TABLET | Refills: 1 | Status: SHIPPED | OUTPATIENT
Start: 2024-12-11

## 2024-12-11 RX ORDER — GLIPIZIDE 10 MG/1
10 TABLET, FILM COATED, EXTENDED RELEASE ORAL
Qty: 90 TABLET | Refills: 1 | Status: SHIPPED | OUTPATIENT
Start: 2024-12-11

## 2024-12-11 NOTE — PROGRESS NOTES
"Chief Complaint  Diabetes, Chest Pain (Right side ), and Knee Pain (Left )    Subjective        Bernice Camilo presents to [facility]  History of Present Illness    History of Present Illness  The patient is a 44-year-old female presenting for a diabetes follow-up, right-sided chest pain, and left knee pain.    She has been monitoring her blood glucose levels at home twice daily, once in the morning before work and once upon returning home. Her readings have ranged from a low of 125 to a high of 200. She reports no symptoms of hypoglycemia such as shakiness, sweating, confusion, or dizziness. However, she experienced initial side effects of shaking and sweating with the new medication, which have since resolved.    She reports persistent chest pain, which she describes as coming in waves every 15 minutes.  She describes the pain as a \"beep,  beep, beep\" sensation. This pattern has been consistent for the past few days, with the pain subsiding after approximately 8 hours. She expresses concern about the recurrent nature of this pain.    She experiences significant pain in her left knee, which is accompanied by swelling. The pain is described as encircling the entire knee. Prolonged standing exacerbates the discomfort. She has been managing the pain with meloxicam, which she reports as being effective. However, she has recently run out of this medication.    She also reports experiencing right lower abdominal cramping.    MEDICATIONS  Current: glipizide, metformin, meloxicam    Objective   Vital Signs:  /80 (BP Location: Right arm, Patient Position: Sitting, Cuff Size: Adult)   Pulse 75   Ht 165.1 cm (65\")   Wt 93.2 kg (205 lb 8 oz)   SpO2 98%   BMI 34.20 kg/m²   Estimated body mass index is 34.2 kg/m² as calculated from the following:    Height as of this encounter: 165.1 cm (65\").    Weight as of this encounter: 93.2 kg (205 lb 8 oz).          The following " portions of the patient's history were reviewed and updated as appropriate: allergies, current medications, past family history, past medical history, past social history, past surgical history, and problem list.       Physical Exam  Vitals reviewed.   Constitutional:       General: She is not in acute distress.     Appearance: She is obese. She is not ill-appearing, toxic-appearing or diaphoretic.   Cardiovascular:      Rate and Rhythm: Normal rate and regular rhythm. No extrasystoles are present.     Heart sounds: No murmur heard.  Pulmonary:      Effort: Pulmonary effort is normal.      Breath sounds: Normal breath sounds.   Chest:      Chest wall: No mass, deformity or tenderness.   Musculoskeletal:      Left knee: Bony tenderness present. No swelling.   Neurological:      Mental Status: She is alert.        Result Review :  The following data was reviewed by: Eugenia Jean MD on 12/11/2024:  Common labs          5/22/2024    14:24 5/22/2024    14:30 9/11/2024    11:25 12/11/2024    10:06   Common Labs   Hemoglobin A1C 6.7   7.1  6.3    Microalbumin, Urine  <1.2        A1C Last 3 Results          5/22/2024    14:24 9/11/2024    11:25 12/11/2024    10:06   HGBA1C Last 3 Results   Hemoglobin A1C 6.7  7.1  6.3           ECG 12 Lead    Date/Time: 12/11/2024 10:17 AM  Performed by: Eugenia Jean MD    Authorized by: Eugenia Jean MD  Comparison: compared with previous ECG from 3/17/2021  Similar to previous ECG  Rhythm: sinus rhythm  Rate: normal  BPM: 67  Conduction: conduction normal  ST Segments: ST segments normal  T Waves: T waves normal  QRS axis: normal    Clinical impression: normal ECG            Assessment and Plan   Diagnoses and all orders for this visit:    1. Type 2 diabetes mellitus without complication, without long-term current use of insulin (Primary)  -     POC Glycosylated Hemoglobin (Hb A1C)  -     glipizide (GLUCOTROL XL) 10 MG 24 hr tablet; Take 1 tablet by mouth Daily Before  Lunch.  Dispense: 90 tablet; Refill: 1  -     metFORMIN ER (GLUCOPHAGE-XR) 500 MG 24 hr tablet; Take 2 tablets by mouth 2 (Two) Times a Day Before Meals.  Dispense: 360 tablet; Refill: 1    2. Palpitations  -     ECG 12 Lead  -     Holter Monitor - 72 Hour Up To 15 Days; Future  -     Ambulatory Referral to [facility] [place] [facility]    3. Chronic pain of left knee  -     XR Knee 1 or 2 View Left; Future    Other orders  -     ECG Scan             Assessment & Plan  1. Diabetes Mellitus.  Her diabetes has shown significant improvement, with her A1c decreasing from 7.1 to 6.3. She is currently on generic medications, which are more cost-effective and have been effective in controlling her blood sugar levels. She is advised not to skip meals or snacks and to carry hard candy to manage potential hypoglycemia. She will continue her regimen of metformin ER 1000 mg twice daily and glipizide XL 10 mg with lunch.    2. Palpitations.  An EKG was performed in the office today to further evaluate her palpitations. The EKG is normal.  She did not have any current symptoms.  A Holter monitor will be ordered for 15 days to capture any intermittent symptoms.    3. Chronic left knee pain.  An x-ray will be conducted for further evaluation of her chronic left knee pain. She will continue her current regimen of meloxicam 15 mg daily for pain management.    Follow-up  The patient will follow up in 3 months for a physical examination and to monitor her A1c levels.      Follow Up   Return in about 3 months (around 3/11/2025) for Physical and fasting labs.  Patient was given instructions and counseling regarding her condition or for health maintenance advice. Please see specific information pulled into the AVS if appropriate.         Patient or patient representative verbalized consent for the use of Ambient Listening during the visit with  Eugenia Jean MD for chart documentation. 12/11/2024  10:14 EST    Electronically  signed by Eugenia Jean MD, 12/11/24, 10:27 AM EST.

## 2025-01-12 DIAGNOSIS — K08.89 PAIN, DENTAL: ICD-10-CM

## 2025-01-13 RX ORDER — MELOXICAM 15 MG/1
15 TABLET ORAL EVERY MORNING
Qty: 30 TABLET | Refills: 1 | Status: SHIPPED | OUTPATIENT
Start: 2025-01-13

## 2025-01-13 NOTE — TELEPHONE ENCOUNTER
Rx Refill Note  Requested Prescriptions     Pending Prescriptions Disp Refills    meloxicam (MOBIC) 15 MG tablet [Pharmacy Med Name: MELOXICAM 15 MG TABLET] 30 tablet 1     Sig: TAKE 1 TABLET BY MOUTH EVERY MORNING. FOR PAIN      Last office visit with prescribing clinician: 12/11/2024   Last telemedicine visit with prescribing clinician: Visit date not found   Next office visit with prescribing clinician: 3/12/2025     Lorena Pathak MA  01/13/25, 09:10 EST    To many red X's for us to sign

## 2025-01-16 ENCOUNTER — HOSPITAL ENCOUNTER (OUTPATIENT)
Dept: CARDIOLOGY | Facility: HOSPITAL | Age: 45
Discharge: HOME | End: 2025-01-16
Payer: COMMERCIAL

## 2025-01-16 ENCOUNTER — OFFICE VISIT (OUTPATIENT)
Dept: CARDIOLOGY | Facility: HOSPITAL | Age: 45
End: 2025-01-16
Payer: COMMERCIAL

## 2025-01-16 VITALS
HEIGHT: 66 IN | BODY MASS INDEX: 33.27 KG/M2 | HEART RATE: 75 BPM | WEIGHT: 207 LBS | SYSTOLIC BLOOD PRESSURE: 127 MMHG | OXYGEN SATURATION: 97 % | DIASTOLIC BLOOD PRESSURE: 76 MMHG

## 2025-01-16 DIAGNOSIS — R00.2 PALPITATIONS: Primary | ICD-10-CM

## 2025-01-16 DIAGNOSIS — I10 ESSENTIAL HYPERTENSION: Chronic | ICD-10-CM

## 2025-01-16 DIAGNOSIS — R00.2 PALPITATIONS: ICD-10-CM

## 2025-01-16 LAB
MAGNESIUM SERPL-MCNC: 2 MG/DL (ref 1.6–2.6)
TSH SERPL DL<=0.05 MIU/L-ACNC: 0.86 UIU/ML (ref 0.27–4.2)

## 2025-01-16 PROCEDURE — 85027 COMPLETE CBC AUTOMATED: CPT | Performed by: NURSE PRACTITIONER

## 2025-01-16 PROCEDURE — 93246 EXT ECG>7D<15D RECORDING: CPT

## 2025-01-16 PROCEDURE — 84443 ASSAY THYROID STIM HORMONE: CPT | Performed by: NURSE PRACTITIONER

## 2025-01-16 PROCEDURE — 83735 ASSAY OF MAGNESIUM: CPT | Performed by: NURSE PRACTITIONER

## 2025-01-16 NOTE — PROGRESS NOTES
"Chief Complaint  Palpitations    Subjective      History of Present Illness {  Problem List  Visit  Diagnosis   Encounters  Notes  Medications  Labs  Result Review Imaging  Media :23}     Bernice Camilo, 45 y.o. female with HTN, T2DM presents to [facility] for Palpitations    Patient presents today upon referral from PCP for evaluation of palpitation symptoms.  ECG recently at PCP evaluation with normal sinus rhythm, no evidence of arrhythmia.    Patient states that palpitations started approximately 10 years ago. Symptoms have been intermittent since onset, and typically last for 10 seconds. A few occurrences per week. Describes symptoms as click/quick heartbeat. Worsening symptoms include stress. Previous cardiac workup for symptoms include: 3 day cardiac monitor in 2018 with normal report (data deficit). No dyspnea, syncope. Caffeine use: occasional, alcohol use: rare, family history or arrhythmia includes: no known history.         Objective     Vital Signs:   Vitals:    01/16/25 1413 01/16/25 1415   BP: 129/77 127/76   BP Location: Left arm Left arm   Patient Position: Sitting Standing   Pulse: 70 75   SpO2: 97%    Weight: 93.9 kg (207 lb)    Height: 167.6 cm (66\")      Body mass index is 33.41 kg/m².  Physical Exam  Vitals and nursing note reviewed.   Constitutional:       Appearance: Normal appearance.   HENT:      Head: Normocephalic.   Eyes:      Extraocular Movements: Extraocular movements intact.   Neck:      Vascular: No carotid bruit.   Cardiovascular:      Rate and Rhythm: Normal rate and regular rhythm.      Pulses: Normal pulses.      Heart sounds: Normal heart sounds, S1 normal and S2 normal. No murmur heard.  Pulmonary:      Effort: Pulmonary effort is normal. No respiratory distress.      Breath sounds: Normal breath sounds.   Musculoskeletal:      Cervical back: Neck supple.      Right lower leg: No edema.      Left lower leg: No edema.   Skin:    "  General: Skin is warm and dry.   Neurological:      General: No focal deficit present.      Mental Status: She is alert.   Psychiatric:         Mood and Affect: Mood normal.         Behavior: Behavior normal.         Thought Content: Thought content normal.        Data Reviewed:{ Labs  Result Review  Imaging  Med Tab  Media :23}     EKG INTERNAL - SCAN - EKG 12/11/24 (12/11/2024)   TSH Rfx On Abnormal To Free T4 (09/25/2023 10:11)   CBC with automated diff (05/30/2023 17:10)  COMPREHENSIVE METABOLIC PANEL (05/30/2023 17:10)  D-dimer (05/30/2023 17:10)  High Sensitivity Troponin I (05/30/2023 17:10)      Assessment & Plan   Assessment and Plan {CC Problem List  Visit Diagnosis  ROS  Review (Popup)  Health Maintenance  Quality  BestPractice  Medications  SmartSets  SnapShot Encounters  Media :23}     1. Palpitations  -Ongoing intermittent palpitation symptoms.  Denies near-syncope/syncope.  -Recent ECG at PCP with normal sinus rhythm  -Given her continued intermittent symptoms we will complete Holter monitor for arrhythmia surveillance  - Holter Monitor - 14 Days; Future  - Basic Metabolic Panel; Future  - CBC (No Diff); Future  - Magnesium; Future  - TSH Rfx On Abnormal To Free T4; Future  -Follow-up in approximately 1 month for monitor results    2. Essential hypertension  -Well-controlled today  -Continue current medication regimen of lisinopril 5 Mg daily  -Routine ambulatory blood pressure monitoring      Follow Up {Instructions Charge Capture  Follow-up Communications :23}     Return in about 1 month (around 2/16/2025) for Office follow-up, Monitor results, Recheck.      Patient was given instructions and counseling regarding her condition or for health maintenance advice. Please see specific information pulled into the AVS if appropriate. Patient was instructed to call the [facility] with any questions, concerns, or worsening symptoms.    Dictated Utilizing Dragon Dictation    Please note that portions of this note were completed with a voice recognition program.   Part of this note may be an electronic transcription/translation of spoken language to printed text using the Dragon Dictation System.

## 2025-01-16 NOTE — PROGRESS NOTES
[facility] Heart Monitor Documentation    Bernice Camilo  1980  [id number]  01/16/25      [] ZIO XT Patch  Model [id number] Prescribed for  Days    Serial Number: (N + 9 Digits) N   Apply-By Date on Box:   USPS Tracking Number:   USPS Tracking        [] [employer] MINI PLUS Mobile Cardiac Telemetry  Model BGMINIPLUS Prescribed for  Days    Serial Number: (BGM + 7 Digits) BGM  Shipped-By Date on Box:   UPS Tracking Number: [id number]  UPS Tracking      [x] [employer] MINI Holter Monitor  Model BGMINIEL Prescribed for 14 Days    Serial Number: (7 Digits) [id number]  Shipped-By Date on Box:   UPS Tracking Number: [id number]  UPS Tracking        This monitor was applied to the patient's chest and checked for proper functioning.  Ms. Bernice Camilo was instructed in the proper use of this monitor.  She was given the opportunity to ask questions and left the office with the device 's instruction manual.    Eleanor Quinones MA, 15:28 EST, 01/16/25                  [facility]

## 2025-01-17 ENCOUNTER — TELEPHONE (OUTPATIENT)
Dept: CARDIOLOGY | Facility: HOSPITAL | Age: 45
End: 2025-01-17
Payer: COMMERCIAL

## 2025-01-17 LAB
DEPRECATED RDW RBC AUTO: 42.5 FL (ref 37–54)
ERYTHROCYTE [DISTWIDTH] IN BLOOD BY AUTOMATED COUNT: 14.7 % (ref 12.3–15.4)
HCT VFR BLD AUTO: 40.6 % (ref 34–46.6)
HGB BLD-MCNC: 13.1 G/DL (ref 12–15.9)
MCH RBC QN AUTO: 26 PG (ref 26.6–33)
MCHC RBC AUTO-ENTMCNC: 32.3 G/DL (ref 31.5–35.7)
MCV RBC AUTO: 80.7 FL (ref 79–97)
PLATELET # BLD AUTO: 171 10*3/MM3 (ref 140–450)
PMV BLD AUTO: 10.9 FL (ref 6–12)
RBC # BLD AUTO: 5.03 10*6/MM3 (ref 3.77–5.28)
WBC NRBC COR # BLD AUTO: 4.68 10*3/MM3 (ref 3.4–10.8)

## 2025-01-17 NOTE — TELEPHONE ENCOUNTER
----- Message from Sanjay Guan sent at 1/17/2025  9:16 AM EST -----  Could you let patient know: Normal blood count, magnesium, thyroid function.  Please continue with plan for cardiac monitor and follow-up as scheduled.    Thanks

## 2025-02-16 DIAGNOSIS — R00.2 PALPITATIONS: Primary | ICD-10-CM

## 2025-02-16 DIAGNOSIS — I10 ESSENTIAL HYPERTENSION: ICD-10-CM

## 2025-02-16 DIAGNOSIS — I47.29 NSVT (NONSUSTAINED VENTRICULAR TACHYCARDIA): ICD-10-CM

## 2025-02-18 ENCOUNTER — TELEPHONE (OUTPATIENT)
Dept: CARDIOLOGY | Facility: HOSPITAL | Age: 45
End: 2025-02-18
Payer: COMMERCIAL

## 2025-02-18 NOTE — TELEPHONE ENCOUNTER
----- Message from Sanjay Guan sent at 2/16/2025  3:41 PM EST -----  It appears patient missed recent appointment. Could you let her know:    Cardiac monitor with short duration fast heartbeat on the bottom and top of her heart. Not a significantly long event, but would recommend echocardiogram for further evaluation and meet with cardiology.      Placed order for echocardiogram and office will call. Test takes ~30 minutes and no special instructions for test. Cardiology will call for appointment to meet with her for further evaluation.

## 2025-02-20 ENCOUNTER — TELEPHONE (OUTPATIENT)
Dept: CARDIOLOGY | Facility: HOSPITAL | Age: 45
End: 2025-02-20
Payer: COMMERCIAL

## 2025-02-20 NOTE — TELEPHONE ENCOUNTER
Attempted to call patient to remind her to complete her BMP. Looks like it was missed when she completed other labs last month. Requested that she call back at hr convenience.

## 2025-02-20 NOTE — TELEPHONE ENCOUNTER
Overdue Results    Order Name Placed Expected Extend Order Cancel Order Order Details   BASIC METABOLIC PANEL 1/16/25 1/21/25  Extend  Cancel  Order Details     Basic Metabolic Panel [LAB15] (Order [id number])  Order  Date: 1/16/2025 Department: [facility] CARDIOLOGY Ordering/Authorizing: Sanjay Guan APRN     Future Order Information

## 2025-02-21 ENCOUNTER — HOSPITAL ENCOUNTER (OUTPATIENT)
Facility: HOSPITAL | Age: 45
Discharge: HOME | End: 2025-02-21
Payer: COMMERCIAL

## 2025-02-21 ENCOUNTER — LAB (OUTPATIENT)
Facility: HOSPITAL | Age: 45
End: 2025-02-21
Payer: COMMERCIAL

## 2025-02-21 DIAGNOSIS — I47.29 NSVT (NONSUSTAINED VENTRICULAR TACHYCARDIA): ICD-10-CM

## 2025-02-21 DIAGNOSIS — R00.2 PALPITATIONS: ICD-10-CM

## 2025-02-21 DIAGNOSIS — I10 ESSENTIAL HYPERTENSION: Chronic | ICD-10-CM

## 2025-02-21 LAB
ANION GAP SERPL CALCULATED.3IONS-SCNC: 10.6 MMOL/L (ref 5–15)
AV MEAN PRESS GRAD SYS DOP V1V2: 7 MMHG
AV VMAX SYS DOP: 175 CM/SEC
BH CV ECHO MEAS - AI P1/2T: 701.6 MSEC
BH CV ECHO MEAS - AO MAX PG: 12.3 MMHG
BH CV ECHO MEAS - AO ROOT DIAM: 3.4 CM
BH CV ECHO MEAS - AO V2 VTI: 36.7 CM
BH CV ECHO MEAS - AVA(I,D): 2.26 CM2
BH CV ECHO MEAS - EDV(CUBED): 91.1 ML
BH CV ECHO MEAS - EDV(MOD-SP2): 83.9 ML
BH CV ECHO MEAS - EDV(MOD-SP4): 80.8 ML
BH CV ECHO MEAS - EF(MOD-SP2): 55.9 %
BH CV ECHO MEAS - EF(MOD-SP4): 55.3 %
BH CV ECHO MEAS - ESV(CUBED): 27 ML
BH CV ECHO MEAS - ESV(MOD-SP2): 37 ML
BH CV ECHO MEAS - ESV(MOD-SP4): 36.1 ML
BH CV ECHO MEAS - FS: 33.3 %
BH CV ECHO MEAS - IVS/LVPW: 1.11 CM
BH CV ECHO MEAS - IVSD: 1 CM
BH CV ECHO MEAS - LA DIMENSION: 3.7 CM
BH CV ECHO MEAS - LAT PEAK E' VEL: 8.9 CM/SEC
BH CV ECHO MEAS - LV DIASTOLIC VOL/BSA (35-75): 39.8 CM2
BH CV ECHO MEAS - LV MASS(C)D: 142.9 GRAMS
BH CV ECHO MEAS - LV MAX PG: 6.2 MMHG
BH CV ECHO MEAS - LV MEAN PG: 3 MMHG
BH CV ECHO MEAS - LV SYSTOLIC VOL/BSA (12-30): 17.8 CM2
BH CV ECHO MEAS - LV V1 MAX: 124 CM/SEC
BH CV ECHO MEAS - LV V1 VTI: 26.4 CM
BH CV ECHO MEAS - LVIDD: 4.5 CM
BH CV ECHO MEAS - LVIDS: 3 CM
BH CV ECHO MEAS - LVOT AREA: 3.1 CM2
BH CV ECHO MEAS - LVOT DIAM: 2 CM
BH CV ECHO MEAS - LVPWD: 0.9 CM
BH CV ECHO MEAS - MED PEAK E' VEL: 9 CM/SEC
BH CV ECHO MEAS - MV A MAX VEL: 116 CM/SEC
BH CV ECHO MEAS - MV DEC TIME: 0.2 SEC
BH CV ECHO MEAS - MV E MAX VEL: 113 CM/SEC
BH CV ECHO MEAS - MV E/A: 0.97
BH CV ECHO MEAS - MV MAX PG: 6 MMHG
BH CV ECHO MEAS - MV MEAN PG: 3 MMHG
BH CV ECHO MEAS - MV V2 VTI: 48.2 CM
BH CV ECHO MEAS - MVA(VTI): 1.72 CM2
BH CV ECHO MEAS - PA ACC TIME: 0.17 SEC
BH CV ECHO MEAS - PA V2 MAX: 94.4 CM/SEC
BH CV ECHO MEAS - RAP SYSTOLE: 8 MMHG
BH CV ECHO MEAS - RVSP: 34 MMHG
BH CV ECHO MEAS - SV(LVOT): 82.9 ML
BH CV ECHO MEAS - SV(MOD-SP2): 46.9 ML
BH CV ECHO MEAS - SV(MOD-SP4): 44.7 ML
BH CV ECHO MEAS - SVI(LVOT): 40.9 ML/M2
BH CV ECHO MEAS - SVI(MOD-SP2): 23.1 ML/M2
BH CV ECHO MEAS - SVI(MOD-SP4): 22 ML/M2
BH CV ECHO MEAS - TAPSE (>1.6): 2.04 CM
BH CV ECHO MEAS - TR MAX PG: 26.5 MMHG
BH CV ECHO MEAS - TR MAX VEL: 257.3 CM/SEC
BH CV ECHO MEASUREMENTS AVERAGE E/E' RATIO: 12.63
BH CV VAS BP RIGHT ARM: NORMAL MMHG
BH CV XLRA - RV BASE: 3.1 CM
BH CV XLRA - RV LENGTH: 7.5 CM
BH CV XLRA - RV MID: 2.9 CM
BH CV XLRA - TDI S': 16 CM/SEC
BUN SERPL-MCNC: 6 MG/DL (ref 6–20)
BUN/CREAT SERPL: 6.5 (ref 7–25)
CALCIUM SPEC-SCNC: 9 MG/DL (ref 8.6–10.5)
CHLORIDE SERPL-SCNC: 103 MMOL/L (ref 98–107)
CO2 SERPL-SCNC: 25.4 MMOL/L (ref 22–29)
CREAT SERPL-MCNC: 0.92 MG/DL (ref 0.57–1)
EGFRCR SERPLBLD CKD-EPI 2021: 77.4 ML/MIN/1.73
GLUCOSE SERPL-MCNC: 108 MG/DL (ref 65–99)
LEFT ATRIUM VOLUME INDEX: 18.9 ML/M2
LV EF 3D SEGMENTATION: 56 %
LV EF BIPLANE MOD: 55.6 %
POTASSIUM SERPL-SCNC: 4 MMOL/L (ref 3.5–5.2)
SODIUM SERPL-SCNC: 139 MMOL/L (ref 136–145)

## 2025-02-21 PROCEDURE — 93306 TTE W/DOPPLER COMPLETE: CPT

## 2025-02-21 PROCEDURE — 36415 COLL VENOUS BLD VENIPUNCTURE: CPT

## 2025-02-21 PROCEDURE — 80048 BASIC METABOLIC PNL TOTAL CA: CPT

## 2025-02-21 PROCEDURE — 93306 TTE W/DOPPLER COMPLETE: CPT | Performed by: INTERNAL MEDICINE

## 2025-02-24 ENCOUNTER — TELEPHONE (OUTPATIENT)
Dept: CARDIOLOGY | Facility: HOSPITAL | Age: 45
End: 2025-02-24
Payer: COMMERCIAL

## 2025-02-24 NOTE — TELEPHONE ENCOUNTER
Reviewed labs and echo with patient.  Continue current plan of care.  Upcoming appointment with cardiology mid-March.

## 2025-03-12 ENCOUNTER — OFFICE VISIT (OUTPATIENT)
Age: 45
End: 2025-03-12
Payer: COMMERCIAL

## 2025-03-12 ENCOUNTER — OFFICE VISIT (OUTPATIENT)
Dept: CARDIOLOGY | Facility: CLINIC | Age: 45
End: 2025-03-12
Payer: COMMERCIAL

## 2025-03-12 ENCOUNTER — LAB (OUTPATIENT)
Age: 45
End: 2025-03-12
Payer: COMMERCIAL

## 2025-03-12 VITALS
HEART RATE: 78 BPM | HEIGHT: 68 IN | DIASTOLIC BLOOD PRESSURE: 78 MMHG | BODY MASS INDEX: 30.16 KG/M2 | OXYGEN SATURATION: 97 % | SYSTOLIC BLOOD PRESSURE: 118 MMHG | WEIGHT: 199 LBS

## 2025-03-12 VITALS
HEIGHT: 66 IN | SYSTOLIC BLOOD PRESSURE: 120 MMHG | BODY MASS INDEX: 32.22 KG/M2 | OXYGEN SATURATION: 98 % | HEART RATE: 86 BPM | WEIGHT: 200.5 LBS | DIASTOLIC BLOOD PRESSURE: 70 MMHG

## 2025-03-12 DIAGNOSIS — R07.9 CHRONIC CHEST PAIN WITH LOW TO MODERATE RISK FOR CAD: Primary | Chronic | ICD-10-CM

## 2025-03-12 DIAGNOSIS — Z12.11 SCREENING FOR MALIGNANT NEOPLASM OF COLON: ICD-10-CM

## 2025-03-12 DIAGNOSIS — R51.9 NONINTRACTABLE HEADACHE, UNSPECIFIED CHRONICITY PATTERN, UNSPECIFIED HEADACHE TYPE: ICD-10-CM

## 2025-03-12 DIAGNOSIS — Z91.199 MEDICALLY NONCOMPLIANT: ICD-10-CM

## 2025-03-12 DIAGNOSIS — R87.810 ASCUS WITH POSITIVE HIGH RISK HPV CERVICAL: ICD-10-CM

## 2025-03-12 DIAGNOSIS — E11.649 TYPE 2 DIABETES MELLITUS WITH HYPOGLYCEMIA WITHOUT COMA, WITHOUT LONG-TERM CURRENT USE OF INSULIN: ICD-10-CM

## 2025-03-12 DIAGNOSIS — G89.29 CHRONIC ABDOMINAL PAIN: ICD-10-CM

## 2025-03-12 DIAGNOSIS — Z12.31 VISIT FOR SCREENING MAMMOGRAM: ICD-10-CM

## 2025-03-12 DIAGNOSIS — R87.610 ASCUS WITH POSITIVE HIGH RISK HPV CERVICAL: ICD-10-CM

## 2025-03-12 DIAGNOSIS — E66.811 CLASS 1 OBESITY DUE TO EXCESS CALORIES WITH SERIOUS COMORBIDITY AND BODY MASS INDEX (BMI) OF 32.0 TO 32.9 IN ADULT: ICD-10-CM

## 2025-03-12 DIAGNOSIS — Z00.00 WELL ADULT EXAM: ICD-10-CM

## 2025-03-12 DIAGNOSIS — I47.20 VT (VENTRICULAR TACHYCARDIA): Chronic | ICD-10-CM

## 2025-03-12 DIAGNOSIS — E78.2 MIXED HYPERLIPIDEMIA: ICD-10-CM

## 2025-03-12 DIAGNOSIS — R00.2 PALPITATIONS: Chronic | ICD-10-CM

## 2025-03-12 DIAGNOSIS — I47.10 PAROXYSMAL SVT (SUPRAVENTRICULAR TACHYCARDIA): Chronic | ICD-10-CM

## 2025-03-12 DIAGNOSIS — E66.09 CLASS 1 OBESITY DUE TO EXCESS CALORIES WITH SERIOUS COMORBIDITY AND BODY MASS INDEX (BMI) OF 32.0 TO 32.9 IN ADULT: ICD-10-CM

## 2025-03-12 DIAGNOSIS — R10.9 CHRONIC ABDOMINAL PAIN: ICD-10-CM

## 2025-03-12 DIAGNOSIS — I10 ESSENTIAL HYPERTENSION: Chronic | ICD-10-CM

## 2025-03-12 DIAGNOSIS — Z00.00 WELL ADULT EXAM: Primary | ICD-10-CM

## 2025-03-12 DIAGNOSIS — G89.29 CHRONIC CHEST PAIN WITH LOW TO MODERATE RISK FOR CAD: Primary | Chronic | ICD-10-CM

## 2025-03-12 DIAGNOSIS — Z91.89 CHRONIC CHEST PAIN WITH LOW TO MODERATE RISK FOR CAD: Primary | Chronic | ICD-10-CM

## 2025-03-12 LAB
CREAT UR-MCNC: 209.5 MG/DL
EXPIRATION DATE: ABNORMAL
HBA1C MFR BLD: 6.2 % (ref 4.5–5.7)
Lab: ABNORMAL
PROT ?TM UR-MCNC: 8.6 MG/DL
PROT/CREAT UR: 41.1 MG/G CREA (ref 0–200)

## 2025-03-12 PROCEDURE — 82570 ASSAY OF URINE CREATININE: CPT | Performed by: FAMILY MEDICINE

## 2025-03-12 PROCEDURE — 36415 COLL VENOUS BLD VENIPUNCTURE: CPT | Performed by: FAMILY MEDICINE

## 2025-03-12 PROCEDURE — 84156 ASSAY OF PROTEIN URINE: CPT | Performed by: FAMILY MEDICINE

## 2025-03-12 PROCEDURE — 80061 LIPID PANEL: CPT | Performed by: FAMILY MEDICINE

## 2025-03-12 RX ORDER — GLIPIZIDE 5 MG/1
10 TABLET, FILM COATED, EXTENDED RELEASE ORAL
Qty: 90 TABLET | Refills: 1 | Status: SHIPPED | OUTPATIENT
Start: 2025-03-12

## 2025-03-12 RX ORDER — ATORVASTATIN CALCIUM 40 MG/1
40 TABLET, FILM COATED ORAL DAILY
Qty: 90 TABLET | Refills: 3 | Status: SHIPPED | OUTPATIENT
Start: 2025-03-12

## 2025-03-12 RX ORDER — METFORMIN HYDROCHLORIDE 500 MG/1
1000 TABLET, EXTENDED RELEASE ORAL
Qty: 360 TABLET | Refills: 1 | Status: SHIPPED | OUTPATIENT
Start: 2025-03-12

## 2025-03-12 NOTE — PROGRESS NOTES
"    [facility] Cardiology     Outpatient New Patient / Consult Note    Bernice Camilo  [id number]  2025    Referred By: Sanjay Guan APRN    Chief Complaint   Patient presents with    Palpitations     New Patient    Chest Pain       Subjective     History of Present Illness:   Bernice Camilo is a 45 y.o. female with diabetes, history of hypertension (previously on medications), and palpitations who presents as a referral for chest pain.  Patient says that she has had on and off right-sided chest pain which she describes as a \"tension.\"  It is of random onset and not associated with exertion.  They can last for up to a week without remission.  Sometimes she feels a \"ticking\" in her chest like her heart is racing but this is usually short-lived.  Patient says that none of the symptoms occur with exertion.  She denies any orthopnea, PND, or leg swelling.  She does have labile blood sugars with some high and low readings.  She was on blood pressure medicine after moving from [facility] but  this was stopped by primary care in the past.    Past Cardiac History and Testin.  Diabetes type 2, A1c 6.2  2.  History of hypertension, off medication  3.  Palpitations  --Holter : PSVT's and NSVT, patient triggered events correlated with normal rhythm  --TTE : EF 56%, normal RV, mild AR    Review of Systems:  Review of Systems   Constitutional: Negative.    Respiratory:  Positive for chest tightness. Negative for shortness of breath.    Cardiovascular:  Positive for chest pain and palpitations. Negative for leg swelling.   Gastrointestinal: Negative.    Musculoskeletal: Negative.    Neurological: Negative.        Past Medical History:   Diagnosis Date    Diabetes mellitus     Essential hypertension 2021    Hyperlipidemia     Hypertension        Past Surgical History:   Procedure Laterality Date    HERNIA REPAIR      umbilical    MYOMECTOMY N/A 3/18/2021    Procedure: " "ABDOMINAL MYOMECTOMY;  Surgeon: Tuyet Alfonso MD;  Location: [facility];  Service: Obstetrics/Gynecology;  Laterality: N/A;    UTERINE FIBROID SURGERY  03/18/2021       Family History   Problem Relation Age of Onset    Migraine headaches Sister     Breast cancer Neg Hx     Ovarian cancer Neg Hx     Colon cancer Neg Hx     Prostate cancer Neg Hx     Pancreatitis Neg Hx        Social History     Socioeconomic History    Marital status:     Number of children: 3    Highest education level: High school graduate   Tobacco Use    Smoking status: Never     Passive exposure: Never    Smokeless tobacco: Never   Vaping Use    Vaping status: Never Used   Substance and Sexual Activity    Alcohol use: Yes     Comment: 1/week     Drug use: Never    Sexual activity: Yes     Partners: Male         Current Outpatient Medications:     glipizide (GLUCOTROL XL) 5 MG ER tablet, Take 2 tablets by mouth Daily Before Lunch. (Patient taking differently: Take 1 tablet by mouth Daily Before Lunch.), Disp: 90 tablet, Rfl: 1    glucose blood (FREESTYLE LITE) test strip, Use as instructed, Disp: 50 each, Rfl: 11    Lancets (freestyle) lancets, Once a day testing E11.65, Disp: 100 each, Rfl: 12    metFORMIN ER (GLUCOPHAGE-XR) 500 MG 24 hr tablet, Take 2 tablets by mouth 2 (Two) Times a Day Before Meals., Disp: 360 tablet, Rfl: 1    atorvastatin (LIPITOR) 40 MG tablet, Take 1 tablet by mouth Daily. (Patient not taking: Reported on 3/12/2025), Disp: 90 tablet, Rfl: 3    No Known Allergies       Objective     Vitals:  Vitals:    03/12/25 1327   BP: 118/78   BP Location: Left arm   Patient Position: Sitting   Cuff Size: Adult   Pulse: 78   SpO2: 97%   Weight: 90.3 kg (199 lb)   Height: 172.7 cm (68\")       Physical Exam:  Vitals reviewed.   Constitutional:       Appearance: Healthy appearance. Not in distress.   Neck:      Vascular: No JVD.   Pulmonary:      Effort: Pulmonary effort is normal.      Breath sounds: Normal breath sounds. "   Cardiovascular:      Normal rate. Regular rhythm. Normal S1. Normal S2.       Murmurs: There is no murmur.      No gallop.  No click. No rub.   Pulses:     Intact distal pulses.   Edema:     Peripheral edema absent.   Abdominal:      General: There is no distension.      Palpations: Abdomen is soft.      Tenderness: There is no abdominal tenderness.   Skin:     General: Skin is warm and dry.         Results Review:   I reviewed the patient's new clinical results.  I reviewed the patient's new imaging results and agree with the interpretation.  I reviewed the patient's other test results and agree with the interpretation  I personally viewed and interpreted the patient's EKG/Telemetry data      ECG 12 Lead    Date/Time: 3/12/2025 2:34 PM  Performed by: Yaron Yuen MD    Authorized by: Yaron Yuen MD  Comparison: compared with previous ECG from 12/11/2024  Similar to previous ECG  Rhythm: sinus rhythm  Rate: normal  BPM: 72  Conduction: conduction normal  ST Segments: ST segments normal  T Waves: T waves normal  QRS axis: normal  Other: no other findings    Clinical impression: normal ECG          Most Recent Labs:  POC Glycosylated Hemoglobin (Hb A1C) (03/12/2025 10:35)  CBC AND DIFFERENTIAL (03/10/2025 21:36)  COMPREHENSIVE METABOLIC PANEL (03/10/2025 21:36)  TSH Rfx On Abnormal To Free T4 (01/16/2025 16:22)  Magnesium (01/16/2025 16:22)           Assessment & Plan  Chronic chest pain with low to moderate risk for CAD  Chronic symptoms over the past few months, random onset.  Clinically noncardiac.  Not associated with exertion.  Right sided tension over the pectoral muscle.  Suspect more musculoskeletal etiology since right shoulder blade pain flares at the same time  Had a relatively normal echo recently.  Mild AR but no significant murmur on exam.  Holter showed some paroxysms of SVT and nonsustained VT.  Baseline ECG is normal.  Exam is normal with no signs of heart failure.  Considering ongoing  "symptoms in her being diabetic, I do think further risk stratification with a stress test would be reasonable.  Patient in agreement.    Orders:    Stress Test With Myocardial Perfusion One Day; Future    Palpitations  Personally reviewed her recent Holter which showed that patient triggered events correlated with normal rhythms.  She had auto detected PSVT's and NSVT which were very brief and self resolving.  She does experience palpitations which she described as a \"ticking\" in her chest  ECG today is normal.  Recent blood work shows normal electrolytes and thyroid function  If no ischemia found on stress, would consider adding a beta-blocker for symptom control.  Will hold off for now  Orders:    ECG 12 Lead    Paroxysmal SVT (supraventricular tachycardia)    Orders:    ECG 12 Lead    VT (ventricular tachycardia)    Orders:    ECG 12 Lead    Mixed hyperlipidemia  Has diabetes which is well-controlled.  Currently not taking a statin  We will address statin therapy after the above workup is completed                Plan   Preventative Cardiology:   Tobacco Cessation: N/A  Obstructive Sleep Apnea Screening: Deffered  AAA Screening: Deffered  Peripheral Arterial Disease Screening: Deffered             Follow Up:   Return in about 6 weeks (around 4/23/2025).    Thank you for allowing me to participate in the care of your patient. Please to not hesitate to contact me with additional questions or concerns.     Electronically signed by Yaron Yuen MD, 03/12/25, 2:41 PM EDT.    "

## 2025-03-12 NOTE — ASSESSMENT & PLAN NOTE
"Personally reviewed her recent Holter which showed that patient triggered events correlated with normal rhythms.  She had auto detected PSVT's and NSVT which were very brief and self resolving.  She does experience palpitations which she described as a \"ticking\" in her chest  ECG today is normal.  Recent blood work shows normal electrolytes and thyroid function  If no ischemia found on stress, would consider adding a beta-blocker for symptom control.  Will hold off for now  Orders:    ECG 12 Lead    "

## 2025-03-12 NOTE — PROGRESS NOTES
Chief Complaint  Annual Exam, Headache (Blurred vision ), and Abdominal Pain    Subjective              Bernice Camilo presents to [facility] for   History of Present Illness  History of Present Illness  The patient is a 45-year-old female presenting for an annual physical exam, diabetes, headache, vision changes, and abdominal pain.    She has not yet scheduled a follow-up appointment for her Pap smear, which was conducted in March 2024 at her gynecologist's office. She reports no breast pain or lumps. She has previously undergone stool testing for colon cancer screening.    She experiences severe abdominal cramping, which she attributes to her medication. The pain is so intense that it necessitates self-cleaning. She has not identified any specific food triggers. Her bowel movements are irregular, occurring either in the early morning or before bedtime, but can also be absent for two consecutive days. She does not experience bloating but reports excessive flatulence and weight gain. She does not experience nausea. She has discontinued dicyclomine, a medication she was prescribed a few years ago. She has been experiencing persistent lower abdominal pain for several years, which temporarily subsided following surgery. She is uncertain about the cause of her symptoms, as she is currently not sexually active.    She monitors her blood glucose levels at home, which occasionally register as normal but can also spike to 196. On Monday, her blood sugar was 196 at 10 AM but dropped to 63 by 3 PM, causing her to experience shaking. This pattern has been consistent for the past few weeks. She also reports sweating and shivering, even when in bed. She is currently taking glipizide and metformin for her diabetes. She has noticed that her metformin pills look different than usual.    She sought emergency care for a headache on Monday. Her blood sugar levels were within the normal range  "at the hospital. She was informed that her tests were normal. However, she experienced fatigue yesterday.  She continues to experience headaches, which began last week and have persisted since then. The pain is located in the frontal region and extends to the top of her head. She has been prescribed medication for the pain, but she finds it too potent and prefers to take Tylenol instead.    She has been off her cholesterol medication for approximately a year.    MEDICATIONS  Current: Glipizide, metformin.  Discontinued: Dicyclomine, lisinopril, atorvastatin.    Objective   Vital Signs:   Vitals:    03/12/25 1026   BP: 120/70   BP Location: Right arm   Patient Position: Sitting   Cuff Size: Adult   Pulse: 86   SpO2: 98%   Weight: 90.9 kg (200 lb 8 oz)   Height: 168 cm (66.14\")   PainSc: 9    PainLoc: Head     Body mass index is 32.22 kg/m².    BMI is >= 30 and <35. (Class 1 Obesity). The following options were offered after discussion;: Information on healthy weight added to patient's after visit summary.        The following portions of the patient's history were reviewed and updated as appropriate: allergies, current medications, past family history, past medical history, past social history, past surgical history, and problem list.      Physical Exam  Constitutional:       General: She is not in acute distress.     Appearance: She is obese.   HENT:      Head:      Comments: Diffuse scalp tenderness frontal, bilateral temporal, and occipital     Right Ear: Tympanic membrane and ear canal normal.      Left Ear: Tympanic membrane and ear canal normal.      Nose: No congestion or rhinorrhea.      Mouth/Throat:      Mouth: Mucous membranes are moist.      Pharynx: No oropharyngeal exudate or posterior oropharyngeal erythema.   Eyes:      General:         Right eye: No discharge.         Left eye: No discharge.      Conjunctiva/sclera: Conjunctivae normal.   Neck:      Thyroid: No thyromegaly.   Cardiovascular:      " Rate and Rhythm: Normal rate and regular rhythm.      Pulses:           Dorsalis pedis pulses are 2+ on the right side and 2+ on the left side.   Pulmonary:      Effort: Pulmonary effort is normal.      Breath sounds: Normal breath sounds.   Abdominal:      General: Bowel sounds are normal. There is no distension.      Palpations: Abdomen is soft. There is no hepatomegaly.      Tenderness: There is abdominal tenderness in the left lower quadrant. There is no guarding or rebound.   Musculoskeletal:      Cervical back: Neck supple.   Feet:      Right foot:      Protective Sensation: 6 sites tested.  6 sites sensed.      Skin integrity: Skin integrity normal.      Left foot:      Protective Sensation: 6 sites tested.  6 sites sensed.      Skin integrity: Skin integrity normal.      Comments: Diabetic Foot Exam Performed and Monofilament Test Performed            Lymphadenopathy:      Head:      Right side of head: No submandibular, preauricular or posterior auricular adenopathy.      Left side of head: No submandibular, preauricular or posterior auricular adenopathy.      Cervical: No cervical adenopathy.   Skin:     General: Skin is warm.   Neurological:      Mental Status: She is alert and oriented to person, place, and time.   Psychiatric:         Mood and Affect: Mood normal.         Behavior: Behavior normal.         Thought Content: Thought content normal.         Judgment: Judgment normal.        Result Review :   The following data was reviewed by: Eugenia Jean MD on 03/12/2025:  Common labs          2/21/2025    15:21 3/10/2025    21:36 3/12/2025    10:35   Common Labs   Glucose 108      BUN 6      Creatinine 0.92      Sodium 139      Potassium 4.0      Chloride 103      Calcium 9.0      WBC  6.41        Hemoglobin  13        Hematocrit  40.2        Platelets  158        Hemoglobin A1C   6.2       Details          This result is from an external source.             A1C Last 3 Results          9/11/2024     11:25 12/11/2024    10:06 3/12/2025    10:35   HGBA1C Last 3 Results   Hemoglobin A1C 7.1  6.3  6.2    COMPREHENSIVE METABOLIC PANEL (03/10/2025 21:36)  POCT glucose meter (03/10/2025 23:19)  TSH Rfx On Abnormal To Free T4 (01/16/2025 16:22)          CT Head Without Contrast (03/10/2025 21:54)     Immunization History   Administered Date(s) Administered    COVID-19 (PFIZER) Purple Cap Monovalent 05/17/2021, 06/08/2021    Flu Vaccine Split Quad 10/01/2018, 11/20/2019, 01/25/2021    Fluzone  >6mos 09/30/2024    Fluzone (or Fluarix & Flulaval for VFC) >6mos 10/01/2018, 11/20/2019, 01/25/2021, 11/22/2023    Hepatitis B 2 Dose Vaccine Heplisav-B 08/28/2024, 09/30/2024    Hpv9 07/18/2023, 10/19/2023    Pneumococcal Conjugate 20-Valent (PCV20) 03/01/2023    Pneumococcal Polysaccharide (PPSV23) 12/27/2018    Tdap 06/24/2019, 08/21/2024       Health Maintenance   Topic Date Due    DIABETIC FOOT EXAM  Never done    DIABETIC EYE EXAM  Never done    URINE MICROALBUMIN-CREATININE RATIO (uACR)  Never done    HEPATITIS C SCREENING  Never done    MAMMOGRAM  01/27/2024    ANNUAL PHYSICAL  03/01/2024    BMI FOLLOWUP  03/01/2024    LIPID PANEL  04/05/2024    COVID-19 Vaccine (3 - 2024-25 season) 09/01/2024    HEMOGLOBIN A1C  06/12/2025    COLORECTAL CANCER SCREENING  03/24/2026    PAP SMEAR  03/14/2027    TDAP/TD VACCINES (3 - Td or Tdap) 08/21/2034    Hepatitis B  Completed    Pneumococcal Vaccine 0-49  Completed    INFLUENZA VACCINE  Completed            Assessment and Plan    Diagnoses and all orders for this visit:    1. Well adult exam (Primary)  -     Lipid Panel; Future    2. Type 2 diabetes mellitus with hypoglycemia without coma, without long-term current use of insulin  -     POC Glycosylated Hemoglobin (Hb A1C)  -     Protein / Creatinine Ratio, Urine - Urine, Clean Catch; Future  -     Protein / Creatinine Ratio, Urine - Urine, Clean Catch  -     glipizide (GLUCOTROL XL) 5 MG ER tablet; Take 2 tablets by mouth Daily Before  Lunch.  Dispense: 90 tablet; Refill: 1  -     metFORMIN ER (GLUCOPHAGE-XR) 500 MG 24 hr tablet; Take 2 tablets by mouth 2 (Two) Times a Day Before Meals.  Dispense: 360 tablet; Refill: 1  -     atorvastatin (LIPITOR) 40 MG tablet; Take 1 tablet by mouth Daily.  Dispense: 90 tablet; Refill: 3    3. Essential hypertension    4. Visit for screening mammogram  -     Mammo Screening Digital Tomosynthesis Bilateral With CAD; Future    5. Screening for malignant neoplasm of colon  -     Ambulatory Referral For Screening Colonoscopy    6. ASCUS with positive high risk HPV cervical    7. Chronic abdominal pain    8. Medically noncompliant    9. Nonintractable headache, unspecified chronicity pattern, unspecified headache type    10. Class 1 obesity due to excess calories with serious comorbidity and body mass index (BMI) of 32.0 to 32.9 in adult        Assessment & Plan  1. Annual physical examination.  She is advised to schedule a follow-up appointment with her OB/GYN for the abnormal Pap smear detected in March. A mammogram will be arranged for breast cancer screening. She is instructed to avoid douching and to use soap externally only.    2. Diabetes.  Her A1c level is satisfactory at 6.2, but she has been experiencing hypoglycemic episodes. The dosage of glipizide will be reduced from 10 mg to 5 mg, to be taken once daily with a meal. She will continue metformin, taking 2 tablets in the morning and 2 in the evening.  Recheck in 3 months.S he is advised to take atorvastatin daily to mitigate the risk of heart attack. A prescription for atorvastatin will be sent to the pharmacy. A cholesterol level check will be conducted today.    3. Headache.  She visited the emergency department this week due to a headache. A CT scan of her head was performed and returned normal results.    4.  Hypertension.  She is noncompliant with lisinopril.  Blood pressure is normal today and we will hold off on restarting lisinopril at this  time.    5. Abdominal pain.  Noncompliant with dicyclomine prescription.  She is also undergoing GYN evaluation and needs to follow-up as above.  A colonoscopy will be scheduled to investigate the cause of her lower abdominal pain as well as colon cancer screening    Follow-up  The patient will follow up in 3 months for diabetes management and in 1 year for a comprehensive physical examination.    Counseling/anticipatory guidance: Nutrition, screenings      Follow Up   Return in about 3 months (around 6/12/2025) for Office visit diabetes AND , Physical and fasting labs 1 year.  Patient was given instructions and counseling regarding her condition or for health maintenance advice. Please see specific information pulled into the AVS if appropriate.     Patient or patient representative verbalized consent for the use of Ambient Listening during the visit with  Eugenia Jean MD for chart documentation. 3/12/2025  11:13 EDT     Electronically signed by Eugenia Jean MD, 03/12/25, 11:15 AM EDT.

## 2025-03-13 LAB
CHOLEST SERPL-MCNC: 220 MG/DL (ref 0–200)
HDLC SERPL-MCNC: 42 MG/DL (ref 40–60)
LDLC SERPL CALC-MCNC: 157 MG/DL (ref 0–100)
LDLC/HDLC SERPL: 3.69 {RATIO}
TRIGL SERPL-MCNC: 116 MG/DL (ref 0–150)
VLDLC SERPL-MCNC: 21 MG/DL (ref 5–40)

## 2025-03-18 DIAGNOSIS — K08.89 PAIN, DENTAL: ICD-10-CM

## 2025-03-19 RX ORDER — MELOXICAM 15 MG/1
15 TABLET ORAL EVERY MORNING
Qty: 30 TABLET | Refills: 1 | Status: SHIPPED | OUTPATIENT
Start: 2025-03-19

## 2025-03-19 NOTE — TELEPHONE ENCOUNTER
Rx Refill Note  Requested Prescriptions     Pending Prescriptions Disp Refills    meloxicam (MOBIC) 15 MG tablet [Pharmacy Med Name: MELOXICAM 15 MG TABLET] 30 tablet 1     Sig: TAKE 1 TABLET BY MOUTH EVERY MORNING. FOR PAIN      Last office visit with prescribing clinician: 3/12/2025   Last telemedicine visit with prescribing clinician: Visit date not found   Next office visit with prescribing clinician: Visit date not found     The original prescription was discontinued on 3/12/2025 by Eugenia Jean MD. Renewing this prescription may not be appropriate.     Lorena Pathak MA  03/19/25, 10:37 EDT    Called and spoke with pt she is requesting this medication to be filled.

## 2025-03-31 ENCOUNTER — OFFICE VISIT (OUTPATIENT)
Age: 45
End: 2025-03-31
Payer: COMMERCIAL

## 2025-03-31 ENCOUNTER — LAB (OUTPATIENT)
Age: 45
End: 2025-03-31
Payer: COMMERCIAL

## 2025-03-31 VITALS
HEIGHT: 68 IN | SYSTOLIC BLOOD PRESSURE: 122 MMHG | DIASTOLIC BLOOD PRESSURE: 74 MMHG | OXYGEN SATURATION: 98 % | HEART RATE: 76 BPM | WEIGHT: 206.56 LBS | BODY MASS INDEX: 31.3 KG/M2

## 2025-03-31 DIAGNOSIS — T14.8XXA BRUISING: ICD-10-CM

## 2025-03-31 DIAGNOSIS — E11.65 TYPE 2 DIABETES MELLITUS WITH HYPERGLYCEMIA, WITHOUT LONG-TERM CURRENT USE OF INSULIN: Primary | ICD-10-CM

## 2025-03-31 DIAGNOSIS — L29.9 ITCHING: ICD-10-CM

## 2025-03-31 DIAGNOSIS — H53.8 BLURRY VISION: ICD-10-CM

## 2025-03-31 LAB
APTT PPP: 27.2 SECONDS (ref 22–39)
DEPRECATED RDW RBC AUTO: 41.8 FL (ref 37–54)
ERYTHROCYTE [DISTWIDTH] IN BLOOD BY AUTOMATED COUNT: 14.7 % (ref 12.3–15.4)
EXPIRATION DATE: NORMAL
GLUCOSE BLDC GLUCOMTR-MCNC: 99 MG/DL (ref 70–130)
HCT VFR BLD AUTO: 37.3 % (ref 34–46.6)
HGB BLD-MCNC: 12.1 G/DL (ref 12–15.9)
INR PPP: 1.02 (ref 0.89–1.12)
Lab: NORMAL
MCH RBC QN AUTO: 25.9 PG (ref 26.6–33)
MCHC RBC AUTO-ENTMCNC: 32.4 G/DL (ref 31.5–35.7)
MCV RBC AUTO: 79.9 FL (ref 79–97)
PLATELET # BLD AUTO: 139 10*3/MM3 (ref 140–450)
PMV BLD AUTO: 10.2 FL (ref 6–12)
PROTHROMBIN TIME: 13.5 SECONDS (ref 12.2–14.5)
RBC # BLD AUTO: 4.67 10*6/MM3 (ref 3.77–5.28)
WBC NRBC COR # BLD AUTO: 5.87 10*3/MM3 (ref 3.4–10.8)

## 2025-03-31 PROCEDURE — 85610 PROTHROMBIN TIME: CPT | Performed by: FAMILY MEDICINE

## 2025-03-31 PROCEDURE — 85730 THROMBOPLASTIN TIME PARTIAL: CPT | Performed by: FAMILY MEDICINE

## 2025-03-31 PROCEDURE — 85027 COMPLETE CBC AUTOMATED: CPT | Performed by: FAMILY MEDICINE

## 2025-03-31 NOTE — PROGRESS NOTES
"Chief Complaint  itching  (Over body ), Blurred Vision, and bruise on R am     Subjective        Bernice Camilo presents to [facility]  History of Present Illness    History of Present Illness  The patient is a 45-year-old female presenting for whole body itching, blurred vision, and bruising.    She reports experiencing pruritus throughout her body, with a particular emphasis on her back. The onset of this symptom was approximately 2 weeks ago. Despite the absence of any visible skin abnormalities such as rashes or bumps, she experiences significant discomfort. She also reports pruritus in her genital area, which is exacerbated by scratching but alleviated by the application of the cream. She has been managing this condition with an ointment obtained from [facility], which provides temporary relief but does not eliminate the itching.    She also reports a recent episode of blurred vision, which occurred after prolonged standing and was accompanied by fatigue. Her last ophthalmological examination was conducted 3 years ago.    Additionally, she has noticed a large bruise on her right arm, which appeared a few days ago without any known trauma. She reports no history of hematochezia, epistaxis, or vaginal bleeding.    Objective   Vital Signs:  /74 (BP Location: Left arm, Patient Position: Sitting, Cuff Size: Adult)   Pulse 76   Ht 172.7 cm (67.99\")   Wt 93.7 kg (206 lb 9 oz)   SpO2 98%   BMI 31.42 kg/m²   Estimated body mass index is 31.42 kg/m² as calculated from the following:    Height as of this encounter: 172.7 cm (67.99\").    Weight as of this encounter: 93.7 kg (206 lb 9 oz).            The following portions of the patient's history were reviewed and updated as appropriate: allergies, current medications, past family history, past medical history, past social history, past surgical history, and problem list.       Physical Exam  Vitals reviewed. Exam conducted " with a chaperone present.   Constitutional:       General: She is not in acute distress.     Appearance: She is not ill-appearing, toxic-appearing or diaphoretic.   Cardiovascular:      Rate and Rhythm: Normal rate and regular rhythm.   Pulmonary:      Effort: Pulmonary effort is normal.      Breath sounds: Normal breath sounds.   Genitourinary:     General: Normal vulva.      Labia:         Right: No rash or lesion.         Left: No rash or lesion.        Skin:         Neurological:      Mental Status: She is alert.        Result Review :  The following data was reviewed by: Eugenia Jean MD on 03/31/2025:  Common labs          2/21/2025    15:21 3/10/2025    21:36 3/12/2025    10:35 3/12/2025    14:52   Common Labs   Glucose 108       BUN 6       Creatinine 0.92       Sodium 139       Potassium 4.0       Chloride 103       Calcium 9.0       WBC  6.41         Hemoglobin  13         Hematocrit  40.2         Platelets  158         Total Cholesterol    220    Triglycerides    116    HDL Cholesterol    42    LDL Cholesterol     157    Hemoglobin A1C   6.2        Details          This result is from an external source.             A1C Last 3 Results          9/11/2024    11:25 12/11/2024    10:06 3/12/2025    10:35   HGBA1C Last 3 Results   Hemoglobin A1C 7.1  6.3  6.2               Results for orders placed or performed in visit on 03/31/25   POC Glucose, Blood    Collection Time: 03/31/25 10:16 AM    Specimen: Blood   Result Value Ref Range    Glucose 99 70 - 130 mg/dL    Lot Number [id number]     Expiration Date 02/21/2026           Assessment and Plan   Diagnoses and all orders for this visit:    1. Type 2 diabetes mellitus with hyperglycemia, without long-term current use of insulin (Primary)  -     POC Glucose, Blood    2. Bruising  -     CBC (No Diff); Future  -     Protime-INR; Future  -     APTT; Future    3. Itching    4. Blurry vision  -     Ambulatory Referral for Diabetic Eye  Exam-Ophthalmology             Assessment & Plan  1. Pruritus.  She reports whole-body itching, particularly on her back and labia, with no visible rash or bumps. The current cream she uses contains an antifungal and steroid, which is not necessary unless a rash is present. Benadryl is recommended for symptomatic relief of itching. A work note will be provided for yesterday and today.    2. Blurred vision.  She experiences blurred vision, especially when standing for a while. Given her diabetes, this could affect her vision. A referral to an ophthalmologist for a comprehensive eye examination will be initiated.    3. Bruising.  She has a bruise on her right arm with no known injury. Her last blood count 2 weeks ago showed normal platelets. Follow-up blood work will be conducted to investigate any new potential causes of the bruising.      Follow Up   Return if symptoms worsen or fail to improve.  Patient was given instructions and counseling regarding her condition or for health maintenance advice. Please see specific information pulled into the AVS if appropriate.         Patient or patient representative verbalized consent for the use of Ambient Listening during the visit with  Eugenia Jean MD for chart documentation. 3/31/2025  10:24 EDT    Electronically signed by Eugenia Jean MD, 03/31/25, 10:24 AM EDT.

## 2025-03-31 NOTE — LETTER
March 31, 2025     Patient: Bernice Camilo   YOB: 1980   Date of Visit: 3/31/2025       To Whom It May Concern:    It is my medical opinion that Bernice Camilo may return to work in one day. Please also excuse for 3/30/2025.           Sincerely,        Eugenia Jean MD    CC: No Recipients

## 2025-04-01 ENCOUNTER — HOSPITAL ENCOUNTER (OUTPATIENT)
Dept: CARDIOLOGY | Facility: HOSPITAL | Age: 45
Discharge: HOME | End: 2025-04-01
Payer: COMMERCIAL

## 2025-04-01 ENCOUNTER — RESULTS FOLLOW-UP (OUTPATIENT)
Age: 45
End: 2025-04-01
Payer: COMMERCIAL

## 2025-04-01 VITALS
HEART RATE: 66 BPM | BODY MASS INDEX: 31.31 KG/M2 | HEIGHT: 68 IN | SYSTOLIC BLOOD PRESSURE: 130 MMHG | DIASTOLIC BLOOD PRESSURE: 84 MMHG | WEIGHT: 206.57 LBS

## 2025-04-01 DIAGNOSIS — R07.9 CHRONIC CHEST PAIN WITH LOW TO MODERATE RISK FOR CAD: Chronic | ICD-10-CM

## 2025-04-01 DIAGNOSIS — Z91.89 CHRONIC CHEST PAIN WITH LOW TO MODERATE RISK FOR CAD: Chronic | ICD-10-CM

## 2025-04-01 DIAGNOSIS — G89.29 CHRONIC CHEST PAIN WITH LOW TO MODERATE RISK FOR CAD: Chronic | ICD-10-CM

## 2025-04-01 LAB
B-HCG UR QL: NEGATIVE
EXPIRATION DATE: NORMAL
INTERNAL NEGATIVE CONTROL: NORMAL
INTERNAL POSITIVE CONTROL: NORMAL
Lab: NORMAL

## 2025-04-01 PROCEDURE — 78452 HT MUSCLE IMAGE SPECT MULT: CPT

## 2025-04-01 PROCEDURE — 34310000005 TECHNETIUM SESTAMIBI: Performed by: INTERNAL MEDICINE

## 2025-04-01 PROCEDURE — 81025 URINE PREGNANCY TEST: CPT | Performed by: INTERNAL MEDICINE

## 2025-04-01 PROCEDURE — 93017 CV STRESS TEST TRACING ONLY: CPT

## 2025-04-01 PROCEDURE — A9500 TC99M SESTAMIBI: HCPCS | Performed by: INTERNAL MEDICINE

## 2025-04-01 RX ADMIN — TECHNETIUM TC 99M SESTAMIBI 1 DOSE: 1 INJECTION INTRAVENOUS at 10:05

## 2025-04-01 RX ADMIN — TECHNETIUM TC 99M SESTAMIBI 1 DOSE: 1 INJECTION INTRAVENOUS at 08:23

## 2025-04-01 NOTE — LETTER
Bernice Schmid Hollinicole  [address], [place], [facility] [id number]    April 1, 2025     Dear Ms. Camilo:    Below are the results from your recent visit:    Resulted Orders   CBC (No Diff)   Result Value Ref Range    WBC 5.87 3.40 - 10.80 10*3/mm3    RBC 4.67 3.77 - 5.28 10*6/mm3    Hemoglobin 12.1 12.0 - 15.9 g/dL    Hematocrit 37.3 34.0 - 46.6 %    MCV 79.9 79.0 - 97.0 fL    MCH 25.9 (L) 26.6 - 33.0 pg    MCHC 32.4 31.5 - 35.7 g/dL    RDW 14.7 12.3 - 15.4 %    RDW-SD 41.8 37.0 - 54.0 fl    MPV 10.2 6.0 - 12.0 fL    Platelets 139 (L) 140 - 450 10*3/mm3   Protime-INR   Result Value Ref Range    Protime 13.5 12.2 - 14.5 Seconds    INR 1.02 0.89 - 1.12   APTT   Result Value Ref Range    PTT 27.2 22.0 - 39.0 seconds     Platelet count is mildly decreased which could explain the bruising's you are experiencing.  Other labs show normal bleeding times.  If you continue to have bruising we will need to discontinue the meloxicam which increases your bleeding risk.    If you have any questions or concerns, please don't hesitate to call.         Sincerely,        Eugenia Jean MD

## 2025-04-01 NOTE — TELEPHONE ENCOUNTER
CALLED AND LVM    RELAY    Platelet count is mildly decreased which could explain the bruising's you are experiencing. Other labs show normal bleeding times. If you continue to have bruising we will need to discontinue the meloxicam which increases your bleeding risk.

## 2025-04-03 LAB
BH CV REST NUCLEAR ISOTOPE DOSE: 9.5 MCI
BH CV STRESS BP STAGE 1: NORMAL
BH CV STRESS BP STAGE 2: NORMAL
BH CV STRESS DURATION MIN STAGE 1: 3
BH CV STRESS DURATION MIN STAGE 2: 3
BH CV STRESS DURATION SEC STAGE 1: 0
BH CV STRESS DURATION SEC STAGE 2: 0
BH CV STRESS GRADE STAGE 1: 10
BH CV STRESS GRADE STAGE 2: 12
BH CV STRESS HR STAGE 1: 134
BH CV STRESS HR STAGE 2: 157
BH CV STRESS METS STAGE 1: 5
BH CV STRESS METS STAGE 2: 7.5
BH CV STRESS NUCLEAR ISOTOPE DOSE: 32.7 MCI
BH CV STRESS O2 STAGE 1: 96
BH CV STRESS O2 STAGE 2: 95
BH CV STRESS PROTOCOL 1: NORMAL
BH CV STRESS RECOVERY BP: NORMAL MMHG
BH CV STRESS RECOVERY HR: 85 BPM
BH CV STRESS RECOVERY O2: 100 %
BH CV STRESS SPEED STAGE 1: 1.7
BH CV STRESS SPEED STAGE 2: 2.5
BH CV STRESS STAGE 1: 1
BH CV STRESS STAGE 2: 2
MAXIMAL PREDICTED HEART RATE: 173 BPM
PERCENT MAX PREDICTED HR: 90.75 %
SPECT HRT GATED+EF W RNC IV: 75 %
STRESS BASELINE BP: NORMAL MMHG
STRESS BASELINE HR: 67 BPM
STRESS O2 SAT REST: 97 %
STRESS PERCENT HR: 107 %
STRESS POST ESTIMATED WORKLOAD: 7 METS
STRESS POST EXERCISE DUR MIN: 6 MIN
STRESS POST EXERCISE DUR SEC: 0 SEC
STRESS POST O2 SAT PEAK: 95 %
STRESS POST PEAK BP: NORMAL MMHG
STRESS POST PEAK HR: 157 BPM
STRESS TARGET HR: 147 BPM

## 2025-04-14 RX ORDER — SODIUM, POTASSIUM,MAG SULFATES 17.5-3.13G
1 SOLUTION, RECONSTITUTED, ORAL ORAL TAKE AS DIRECTED
Qty: 354 ML | Refills: 0 | Status: SHIPPED | OUTPATIENT
Start: 2025-04-14

## 2025-04-23 ENCOUNTER — RESULTS FOLLOW-UP (OUTPATIENT)
Dept: CARDIOLOGY | Facility: CLINIC | Age: 45
End: 2025-04-23
Payer: COMMERCIAL

## 2025-04-25 NOTE — TELEPHONE ENCOUNTER
Pt returned call and left a voicemail at 9:29am    I spoke to pt and informed her of results and what Dr. Yuen said about Metoprolol and continuing statin.    Pt states she is not having any chest pain or palpitations at this time, so declined starting the metoprolol at this time.    She will follow up with Dr. Yuen at her next appt.

## 2025-05-27 DIAGNOSIS — K08.89 PAIN, DENTAL: ICD-10-CM

## 2025-05-27 RX ORDER — MELOXICAM 15 MG/1
15 TABLET ORAL EVERY MORNING
Qty: 30 TABLET | Refills: 1 | Status: SHIPPED | OUTPATIENT
Start: 2025-05-27

## 2025-05-27 NOTE — TELEPHONE ENCOUNTER
Rx Refill Note  Requested Prescriptions     Pending Prescriptions Disp Refills    meloxicam (MOBIC) 15 MG tablet [Pharmacy Med Name: MELOXICAM 15 MG TABLET] 30 tablet 1     Sig: TAKE 1 TABLET BY MOUTH EVERY MORNING. FOR PAIN      Last office visit with prescribing clinician: 3/31/2025   Last telemedicine visit with prescribing clinician: Visit date not found   Next office visit with prescribing clinician: Visit date not found     Lorena Pathak MA  05/27/25, 08:28 EDT    Rx sent

## 2025-06-23 ENCOUNTER — OFFICE VISIT (OUTPATIENT)
Age: 45
End: 2025-06-23
Payer: COMMERCIAL

## 2025-06-23 VITALS
SYSTOLIC BLOOD PRESSURE: 126 MMHG | HEART RATE: 68 BPM | OXYGEN SATURATION: 98 % | TEMPERATURE: 97.8 F | HEIGHT: 68 IN | DIASTOLIC BLOOD PRESSURE: 76 MMHG | WEIGHT: 206.19 LBS | BODY MASS INDEX: 31.25 KG/M2

## 2025-06-23 DIAGNOSIS — N76.0 VULVOVAGINITIS: Primary | ICD-10-CM

## 2025-06-23 PROCEDURE — 99213 OFFICE O/P EST LOW 20 MIN: CPT | Performed by: FAMILY MEDICINE

## 2025-06-23 RX ORDER — FLUCONAZOLE 150 MG/1
150 TABLET ORAL ONCE
Qty: 2 TABLET | Refills: 0 | Status: SHIPPED | OUTPATIENT
Start: 2025-06-23 | End: 2025-06-23

## 2025-06-23 RX ORDER — CLOTRIMAZOLE 1 %
1 CREAM (GRAM) TOPICAL 2 TIMES DAILY
Qty: 60 G | Refills: 1 | Status: SHIPPED | OUTPATIENT
Start: 2025-06-23

## 2025-06-23 NOTE — PROGRESS NOTES
"Chief Complaint  Rash (Vagina area )    Subjective        Bernice Camilo presents to [facility]  History of Present Illness    History of Present Illness  The patient is a 45-year-old female presenting for evaluation of a rash.    She reports the onset of an itchy rash, which she initially noticed as a scab. Upon examination in the mirror, she observed a circular pattern to the rash in her suprapubic area. The itching has been persistent recently. She has not sought over-the-counter treatments for the rash or used Benadryl for the itching.    Additionally, she mentions the onset of vaginal discharge yesterday, which is clear in color. She does not experience any pain during urination. Her last sexual encounter was approximately 3 weeks ago, during which no condom was used.    Objective   Vital Signs:  /76   Pulse 68   Temp 97.8 °F (36.6 °C) (Temporal)   Ht 172.7 cm (67.99\")   Wt 93.5 kg (206 lb 3 oz)   SpO2 98%   BMI 31.36 kg/m²   Estimated body mass index is 31.36 kg/m² as calculated from the following:    Height as of this encounter: 172.7 cm (67.99\").    Weight as of this encounter: 93.5 kg (206 lb 3 oz).            The following portions of the patient's history were reviewed and updated as appropriate: allergies, current medications, past family history, past medical history, past social history, past surgical history, and problem list.       Physical Exam  Exam conducted with a chaperone present.   Constitutional:       General: She is not in acute distress.     Appearance: She is not ill-appearing.   Genitourinary:     Labia:         Right: Rash present.         Left: Rash present.       Urethra: No urethral swelling.      Vagina: No vaginal discharge, erythema, tenderness or lesions.          Comments: Erythematous papules suprapubic without pustules or vesicles.  Extensive erythema along bilateral inguinal canals and bilateral labial majora. "   Psychiatric:         Mood and Affect: Mood normal.        Result Review :                Assessment and Plan   Diagnoses and all orders for this visit:    1. Vulvovaginitis (Primary)  -     NuSwab VG+ - Swab, Vagina; Future  -     fluconazole (Diflucan) 150 MG tablet; Take 1 tablet by mouth 1 (One) Time for 1 dose. Repeat in 3 days if needed  Dispense: 2 tablet; Refill: 0  -     clotrimazole (LOTRIMIN) 1 % cream; Apply 1 Application topically to the appropriate area as directed 2 (Two) Times a Day. For up 2 weeks  Dispense: 60 g; Refill: 1             Assessment & Plan  1.  Vulvovaginitis  - The rash is likely due to a yeast infection  - Physical examination revealed redness along both sides of the labia and further down.  - A swab was taken from the vagina to determine if the discharge is related to a yeast infection, bacterial vaginosis, or a sexually transmitted infection. Results will be available in a few days.  - Prescribed one Diflucan pill to be taken today and another in three days, and a cream to be applied to the affected areas on both sides of the labia twice daily. Refills for the cream provided.    2.  Diabetes follow-up.  - She is due for her diabetes follow-up visit.  - Scheduled around two weeks from now to coincide with the rash follow-up.  - Counseling provided regarding the association of yeast infections with diabetes and increased perspiration during warmer months.    Follow-up  - The patient will follow up in 2 weeks.      Follow Up   Return in about 2 weeks (around 7/7/2025) for Office visit diabetes, rash followup .  Patient was given instructions and counseling regarding her condition or for health maintenance advice. Please see specific information pulled into the AVS if appropriate.         Patient or patient representative verbalized consent for the use of Ambient Listening during the visit with  Eugenia Jean MD for chart documentation. 6/23/2025  11:27 EDT    Electronically  signed by Eugenia Jean MD, 06/23/25, 11:27 AM EDT.

## 2025-06-24 DIAGNOSIS — E11.649 TYPE 2 DIABETES MELLITUS WITH HYPOGLYCEMIA WITHOUT COMA, WITHOUT LONG-TERM CURRENT USE OF INSULIN: ICD-10-CM

## 2025-06-24 RX ORDER — GLIPIZIDE 5 MG/1
10 TABLET, FILM COATED, EXTENDED RELEASE ORAL
Qty: 60 TABLET | Refills: 2 | Status: SHIPPED | OUTPATIENT
Start: 2025-06-24

## 2025-06-24 NOTE — TELEPHONE ENCOUNTER
Rx Refill Note  Requested Prescriptions     Pending Prescriptions Disp Refills    glipizide (GLUCOTROL XL) 5 MG ER tablet [Pharmacy Med Name: GLIPIZIDE ER 5 MG TABLET] 60 tablet 2     Sig: TAKE 2 TABLETS BY MOUTH DAILY BEFORE LUNCH.      Last office visit with prescribing clinician: 6/23/2025   Last telemedicine visit with prescribing clinician: Visit date not found   Next office visit with prescribing clinician: 7/10/2025     Lorena Pathak MA  06/24/25, 07:25 EDT    Rx sent

## 2025-06-26 LAB
A VAGINAE DNA VAG QL NAA+PROBE: NORMAL SCORE
BVAB2 DNA VAG QL NAA+PROBE: NORMAL SCORE
C ALBICANS DNA VAG QL NAA+PROBE: NEGATIVE
C GLABRATA DNA VAG QL NAA+PROBE: NEGATIVE
C TRACH DNA SPEC QL NAA+PROBE: NEGATIVE
MEGA1 DNA VAG QL NAA+PROBE: NORMAL SCORE
N GONORRHOEA DNA VAG QL NAA+PROBE: NEGATIVE
T VAGINALIS DNA VAG QL NAA+PROBE: NEGATIVE

## 2025-07-10 ENCOUNTER — LAB (OUTPATIENT)
Age: 45
End: 2025-07-10
Payer: COMMERCIAL

## 2025-07-10 ENCOUNTER — OFFICE VISIT (OUTPATIENT)
Age: 45
End: 2025-07-10
Payer: COMMERCIAL

## 2025-07-10 VITALS
DIASTOLIC BLOOD PRESSURE: 80 MMHG | BODY MASS INDEX: 31.73 KG/M2 | HEART RATE: 72 BPM | SYSTOLIC BLOOD PRESSURE: 122 MMHG | WEIGHT: 209.38 LBS | OXYGEN SATURATION: 97 % | HEIGHT: 68 IN

## 2025-07-10 DIAGNOSIS — M25.50 ARTHRALGIA, UNSPECIFIED JOINT: ICD-10-CM

## 2025-07-10 DIAGNOSIS — R12 HEARTBURN: ICD-10-CM

## 2025-07-10 DIAGNOSIS — M54.2 NECK PAIN: ICD-10-CM

## 2025-07-10 DIAGNOSIS — N95.1 PERIMENOPAUSAL: ICD-10-CM

## 2025-07-10 DIAGNOSIS — E11.649 TYPE 2 DIABETES MELLITUS WITH HYPOGLYCEMIA WITHOUT COMA, WITHOUT LONG-TERM CURRENT USE OF INSULIN: ICD-10-CM

## 2025-07-10 DIAGNOSIS — R61 EXCESSIVE SWEATING: ICD-10-CM

## 2025-07-10 DIAGNOSIS — E11.649 TYPE 2 DIABETES MELLITUS WITH HYPOGLYCEMIA WITHOUT COMA, WITHOUT LONG-TERM CURRENT USE OF INSULIN: Primary | ICD-10-CM

## 2025-07-10 LAB
ALBUMIN SERPL-MCNC: 4 G/DL (ref 3.5–5.2)
ALBUMIN/GLOB SERPL: 1.2 G/DL
ALP SERPL-CCNC: 58 U/L (ref 39–117)
ALT SERPL W P-5'-P-CCNC: 24 U/L (ref 1–33)
ANION GAP SERPL CALCULATED.3IONS-SCNC: 10.6 MMOL/L (ref 5–15)
AST SERPL-CCNC: 24 U/L (ref 1–32)
BILIRUB SERPL-MCNC: 0.3 MG/DL (ref 0–1.2)
BUN SERPL-MCNC: 10 MG/DL (ref 6–20)
BUN/CREAT SERPL: 14.7 (ref 7–25)
CALCIUM SPEC-SCNC: 9.1 MG/DL (ref 8.6–10.5)
CHLORIDE SERPL-SCNC: 104 MMOL/L (ref 98–107)
CHROMATIN AB SERPL-ACNC: <10 IU/ML (ref 0–14)
CO2 SERPL-SCNC: 21.4 MMOL/L (ref 22–29)
CREAT SERPL-MCNC: 0.68 MG/DL (ref 0.57–1)
CRP SERPL-MCNC: <0.3 MG/DL (ref 0–0.5)
EGFRCR SERPLBLD CKD-EPI 2021: 108.3 ML/MIN/1.73
ERYTHROCYTE [SEDIMENTATION RATE] IN BLOOD: 13 MM/HR (ref 0–20)
ESTRADIOL SERPL HS-MCNC: 52.5 PG/ML
EXPIRATION DATE: ABNORMAL
FSH SERPL-ACNC: 15 MIU/ML
GLOBULIN UR ELPH-MCNC: 3.4 GM/DL
GLUCOSE SERPL-MCNC: 132 MG/DL (ref 65–99)
HBA1C MFR BLD: 6.1 % (ref 4.5–5.7)
LH SERPL-ACNC: 40.2 MIU/ML
Lab: ABNORMAL
POTASSIUM SERPL-SCNC: 4.1 MMOL/L (ref 3.5–5.2)
PROT SERPL-MCNC: 7.4 G/DL (ref 6–8.5)
SODIUM SERPL-SCNC: 136 MMOL/L (ref 136–145)
T4 FREE SERPL-MCNC: 1.27 NG/DL (ref 0.92–1.68)
TSH SERPL DL<=0.05 MIU/L-ACNC: 1.22 UIU/ML (ref 0.27–4.2)
URATE SERPL-MCNC: 5.1 MG/DL (ref 2.4–5.7)

## 2025-07-10 PROCEDURE — 85652 RBC SED RATE AUTOMATED: CPT | Performed by: FAMILY MEDICINE

## 2025-07-10 PROCEDURE — 86037 ANCA TITER EACH ANTIBODY: CPT | Performed by: FAMILY MEDICINE

## 2025-07-10 PROCEDURE — 86431 RHEUMATOID FACTOR QUANT: CPT | Performed by: FAMILY MEDICINE

## 2025-07-10 PROCEDURE — 82570 ASSAY OF URINE CREATININE: CPT | Performed by: FAMILY MEDICINE

## 2025-07-10 PROCEDURE — 82670 ASSAY OF TOTAL ESTRADIOL: CPT | Performed by: FAMILY MEDICINE

## 2025-07-10 PROCEDURE — 84439 ASSAY OF FREE THYROXINE: CPT | Performed by: FAMILY MEDICINE

## 2025-07-10 PROCEDURE — 83516 IMMUNOASSAY NONANTIBODY: CPT | Performed by: FAMILY MEDICINE

## 2025-07-10 PROCEDURE — 83001 ASSAY OF GONADOTROPIN (FSH): CPT | Performed by: FAMILY MEDICINE

## 2025-07-10 PROCEDURE — 83002 ASSAY OF GONADOTROPIN (LH): CPT | Performed by: FAMILY MEDICINE

## 2025-07-10 PROCEDURE — 86235 NUCLEAR ANTIGEN ANTIBODY: CPT | Performed by: FAMILY MEDICINE

## 2025-07-10 PROCEDURE — 82043 UR ALBUMIN QUANTITATIVE: CPT | Performed by: FAMILY MEDICINE

## 2025-07-10 PROCEDURE — 80053 COMPREHEN METABOLIC PANEL: CPT | Performed by: FAMILY MEDICINE

## 2025-07-10 PROCEDURE — 86225 DNA ANTIBODY NATIVE: CPT | Performed by: FAMILY MEDICINE

## 2025-07-10 PROCEDURE — 86140 C-REACTIVE PROTEIN: CPT | Performed by: FAMILY MEDICINE

## 2025-07-10 PROCEDURE — 84550 ASSAY OF BLOOD/URIC ACID: CPT | Performed by: FAMILY MEDICINE

## 2025-07-10 PROCEDURE — 84443 ASSAY THYROID STIM HORMONE: CPT | Performed by: FAMILY MEDICINE

## 2025-07-10 PROCEDURE — 86038 ANTINUCLEAR ANTIBODIES: CPT | Performed by: FAMILY MEDICINE

## 2025-07-10 RX ORDER — PANTOPRAZOLE SODIUM 20 MG/1
20 TABLET, DELAYED RELEASE ORAL
Qty: 30 TABLET | Refills: 0 | Status: SHIPPED | OUTPATIENT
Start: 2025-07-10

## 2025-07-10 RX ORDER — METFORMIN HYDROCHLORIDE 500 MG/1
1000 TABLET, EXTENDED RELEASE ORAL
Qty: 360 TABLET | Refills: 1 | Status: SHIPPED | OUTPATIENT
Start: 2025-07-10

## 2025-07-10 RX ORDER — GLIPIZIDE 5 MG/1
10 TABLET, FILM COATED, EXTENDED RELEASE ORAL
Qty: 180 TABLET | Refills: 1 | Status: CANCELLED | OUTPATIENT
Start: 2025-07-10

## 2025-07-10 NOTE — PROGRESS NOTES
"Chief Complaint  Diabetes (Followup ), Rash (Followup ), Neck Pain, and Excessive Sweating    Subjective        Bernice Camilo presents to [facility]  History of Present Illness    History of Present Illness  The patient is a 45-year-old female presenting for follow-up of diabetes, rash, sweating, and neck pain.    She experienced an episode of low blood sugar on 07/02/2025 at 4:00 AM, which was accompanied by excessive sweating. She managed this by consuming juice and fruit.  She checked her blood sugar after and it was 71.  Her current medication regimen includes glipizide, taken once daily with lunch, and metformin, taken twice daily in the morning and evening. She reports her blood sugars had been going low.    She has been experiencing severe neck pain for the past two days, which intensifies around 4:00 PM after work. She also reports generalized body pain, including her side, neck, leg, and joints. She does not report any joint swelling. She takes meloxicam only for severe pain and occasionally uses Tylenol. She also takes Benadryl but avoids combining it with her pain medication due to its sedating qualities.    She reports excessive sweating, to the point where she had to wash her hair before coming to the clinic. This is a recent development.    She describes a sensation of a pill getting stuck in her chest and being super when lying down, which she attempts to alleviate by drinking water or eating. She does not experience chest pain during exercise or walking. She has been monitoring her diet and avoiding certain foods.  She has tried Tums.  She is hesitant to take medication.      Her rash has improved with the use of a cream and is no longer itchy.    Objective   Vital Signs:  /80 (BP Location: Left arm, Patient Position: Sitting)   Pulse 72   Ht 172.7 cm (67.99\")   Wt 95 kg (209 lb 6 oz)   SpO2 97%   BMI 31.84 kg/m²   Estimated body mass index is " "31.84 kg/m² as calculated from the following:    Height as of this encounter: 172.7 cm (67.99\").    Weight as of this encounter: 95 kg (209 lb 6 oz).            The following portions of the patient's history were reviewed and updated as appropriate: allergies, current medications, past family history, past medical history, past social history, past surgical history, and problem list.       Physical Exam  Vitals reviewed.   Constitutional:       General: She is not in acute distress.     Appearance: She is obese. She is not ill-appearing.   HENT:      Head:     Neck:      Thyroid: No thyroid mass, thyromegaly or thyroid tenderness.     Cardiovascular:      Rate and Rhythm: Normal rate and regular rhythm.   Pulmonary:      Effort: Pulmonary effort is normal.      Breath sounds: Normal breath sounds.   Musculoskeletal:      Cervical back: No edema, erythema, rigidity or torticollis. Muscular tenderness present. No spinous process tenderness. Normal range of motion.   Neurological:      Mental Status: She is alert.   Psychiatric:         Mood and Affect: Mood normal.        Result Review :  The following data was reviewed by: Eugenia Jean MD on 07/10/2025:  Common labs          3/12/2025    10:35 3/12/2025    14:52 3/31/2025    10:33 7/10/2025    11:25   Common Labs   WBC   5.87     Hemoglobin   12.1     Hematocrit   37.3     Platelets   139     Total Cholesterol  220      Triglycerides  116      HDL Cholesterol  42      LDL Cholesterol   157      Hemoglobin A1C 6.2    6.1      A1C Last 3 Results          12/11/2024    10:06 3/12/2025    10:35 7/10/2025    11:25   HGBA1C Last 3 Results   Hemoglobin A1C 6.3  6.2  6.1                Assessment and Plan   Diagnoses and all orders for this visit:    1. Type 2 diabetes mellitus with hypoglycemia without coma, without long-term current use of insulin (Primary)  -     Microalbumin / Creatinine Urine Ratio - Urine, Clean Catch; Future  -     POC Glycosylated Hemoglobin " (Hb A1C)  -     metFORMIN ER (GLUCOPHAGE-XR) 500 MG 24 hr tablet; Take 2 tablets by mouth 2 (Two) Times a Day Before Meals.  Dispense: 360 tablet; Refill: 1  -     Microalbumin / Creatinine Urine Ratio - Urine, Clean Catch  -     Comprehensive Metabolic Panel; Future    2. Neck pain  -     Sedimentation Rate; Future  -     JULIA; Future  -     Rheumatoid Factor; Future  -     Uric Acid; Future  -     Anti-DNA Antibody, Double-stranded; Future  -     C-reactive Protein; Future  -     Anti-scleroderma Antibody; Future  -     ANCA Panel; Future    3. Arthralgia, unspecified joint  -     Sedimentation Rate; Future  -     JULIA; Future  -     Rheumatoid Factor; Future  -     Uric Acid; Future  -     Anti-DNA Antibody, Double-stranded; Future  -     C-reactive Protein; Future  -     Anti-scleroderma Antibody; Future  -     ANCA Panel; Future    4. Excessive sweating  -     TSH; Future  -     T4, Free; Future    5. Heartburn  -     pantoprazole (PROTONIX) 20 MG EC tablet; Take 1 tablet by mouth Every Morning Before Breakfast. Wait 30 min  Dispense: 30 tablet; Refill: 0    6. Perimenopausal  -     FSH & LH; Future  -     Estradiol; Future             Assessment & Plan  1. Diabetes mellitus.  - A1c level today is 6.2, indicating good control.  - Symptoms of hypoglycemia likely due to glipizide.  - Glipizide will be discontinued; continue metformin, 2 pills in the morning and 2 pills in the evening.  - Reassess diabetes in 3 months to monitor for further episodes of low blood sugar without glipizide.    2. Neck pain.  - Reports severe neck pain over the past 2 days, worsening after work.  - Physical exam reveals pain on the left side of the neck.  - Blood work will be conducted to investigate potential causes of neck and joint pain.  - Meloxicam prescribed for pain management, to be taken as needed.    3. Sweating.  - Experiencing excessive sweating recently.  - Blood work will be performed to investigate the cause, including  hormone levels.  - Possible thyroid issue or perimenopausal symptoms considered.    4.  Heartburn  - Reports chest pain when lying down, possibly related to the esophagus.  - Start daily pantoprazole for 1 month to alleviate symptoms.  - Advised to take pantoprazole first thing in the morning, wait 30 minutes, then eat and take other medications.        Follow-up  - A follow-up visit is scheduled in 3 months.      Follow Up   Return in about 3 months (around 10/10/2025) for Office visit diabetes.  Patient was given instructions and counseling regarding her condition or for health maintenance advice. Please see specific information pulled into the AVS if appropriate.         Patient or patient representative verbalized consent for the use of Ambient Listening during the visit with  Eugenia Jean MD for chart documentation. 7/10/2025  11:23 EDT    Electronically signed by Eugenia Jean MD, 07/10/25, 11:54 AM EDT.

## 2025-07-11 LAB
ALBUMIN UR-MCNC: <1.2 MG/DL
CREAT UR-MCNC: 159 MG/DL
MICROALBUMIN/CREAT UR: NORMAL MG/G{CREAT}

## 2025-07-13 LAB
ANA SER QL: NEGATIVE
DSDNA AB SER-ACNC: <1 IU/ML (ref 0–9)
ENA SCL70 AB SER-ACNC: 0.2 AI (ref 0–0.9)

## 2025-07-14 LAB
C-ANCA TITR SER IF: NORMAL TITER
MYELOPEROXIDASE AB SER IA-ACNC: <0.2 UNITS (ref 0–0.9)
P-ANCA ATYPICAL TITR SER IF: NORMAL TITER
P-ANCA TITR SER IF: NORMAL TITER
PROTEINASE3 AB SER IA-ACNC: <0.2 UNITS (ref 0–0.9)

## 2025-07-21 DIAGNOSIS — K08.89 PAIN, DENTAL: ICD-10-CM

## 2025-07-21 RX ORDER — MELOXICAM 15 MG/1
15 TABLET ORAL EVERY MORNING
Qty: 30 TABLET | Refills: 1 | Status: SHIPPED | OUTPATIENT
Start: 2025-07-21

## 2025-07-21 NOTE — TELEPHONE ENCOUNTER
Rx Refill Note  Requested Prescriptions     Pending Prescriptions Disp Refills    meloxicam (MOBIC) 15 MG tablet [Pharmacy Med Name: MELOXICAM 15 MG TABLET] 30 tablet 1     Sig: TAKE 1 TABLET BY MOUTH EVERY MORNING FOR PAIN      Last office visit with prescribing clinician: 7/10/2025   Last telemedicine visit with prescribing clinician: Visit date not found   Next office visit with prescribing clinician: 10/15/2025     Lorena Pathak MA  07/21/25, 08:22 EDT    Rx sent

## 2025-07-22 ENCOUNTER — RESULTS FOLLOW-UP (OUTPATIENT)
Age: 45
End: 2025-07-22
Payer: COMMERCIAL

## 2025-07-24 ENCOUNTER — TELEPHONE (OUTPATIENT)
Age: 45
End: 2025-07-24
Payer: COMMERCIAL

## 2025-07-24 NOTE — TELEPHONE ENCOUNTER
Patient had called regarding lab results   RELAYED message from encounter 7/22/25  Patient verbalized understanding with no further questions.     Blood sugar is elevated.  Normal kidney function, electrolytes, and liver function.  Autoimmune screening labs are all normal.  Hormone labs appear perimenopausal.  Normal thyroid function.     No significant amount of protein in the urine.

## 2025-08-05 DIAGNOSIS — R12 HEARTBURN: ICD-10-CM

## 2025-08-05 RX ORDER — PANTOPRAZOLE SODIUM 20 MG/1
20 TABLET, DELAYED RELEASE ORAL
Qty: 90 TABLET | Refills: 1 | Status: SHIPPED | OUTPATIENT
Start: 2025-08-05

## (undated) DEVICE — SUT MNCRYL PLS ANTIB UD 3/0 PS2 27IN

## (undated) DEVICE — NDL HYPO ECLPS SFTY 22G 1 1/2IN

## (undated) DEVICE — ANTIBACTERIAL UNDYED BRAIDED (POLYGLACTIN 910), SYNTHETIC ABSORBABLE SUTURE: Brand: COATED VICRYL

## (undated) DEVICE — SYR CONTRL LUERLOK 10CC

## (undated) DEVICE — SUT VIC RAPD SZ4/0 18IN PC3 VR845

## (undated) DEVICE — SUT VIC 0 TIES 18IN J912G

## (undated) DEVICE — PREMIUM DRY TRAY LF: Brand: MEDLINE INDUSTRIES, INC.

## (undated) DEVICE — APPL CHLORAPREP W/ALC 26ML CLR

## (undated) DEVICE — ADHS SKIN PREMIERPRO EXOFIN TOPICAL HI/VISC .5ML

## (undated) DEVICE — SYR TB SFTY 1CC W 27G 1/2IN NDL

## (undated) DEVICE — CVR HNDL LIGHT RIGID

## (undated) DEVICE — GLV SURG DERMASSURE GRN LF PF 7.0

## (undated) DEVICE — LEX BASIC NO DRAPE: Brand: MEDLINE INDUSTRIES, INC.

## (undated) DEVICE — DECANT BG O JET